# Patient Record
Sex: MALE | Race: WHITE | NOT HISPANIC OR LATINO | URBAN - METROPOLITAN AREA
[De-identification: names, ages, dates, MRNs, and addresses within clinical notes are randomized per-mention and may not be internally consistent; named-entity substitution may affect disease eponyms.]

---

## 2024-10-11 ENCOUNTER — HOSPITAL ENCOUNTER (INPATIENT)
Facility: HOSPITAL | Age: 39
LOS: 1 days | Discharge: HOME | DRG: 308 | End: 2024-10-12
Attending: EMERGENCY MEDICINE | Admitting: INTERNAL MEDICINE
Payer: MEDICARE

## 2024-10-11 DIAGNOSIS — I47.10 SVT (SUPRAVENTRICULAR TACHYCARDIA): ICD-10-CM

## 2024-10-11 DIAGNOSIS — R06.00 DYSPNEA: ICD-10-CM

## 2024-10-11 DIAGNOSIS — I49.9 CARDIAC RHYTHM DISORDER OR DISTURBANCE OR CHANGE: ICD-10-CM

## 2024-10-11 DIAGNOSIS — R00.0 TACHYCARDIA: ICD-10-CM

## 2024-10-11 PROBLEM — N18.6 ESRD (END STAGE RENAL DISEASE): Chronic | Status: ACTIVE | Noted: 2024-10-11

## 2024-10-11 PROBLEM — E87.5 HYPERKALEMIA: Status: ACTIVE | Noted: 2024-10-11

## 2024-10-11 PROBLEM — E87.3 ACUTE RESPIRATORY ALKALOSIS: Status: ACTIVE | Noted: 2024-10-11

## 2024-10-11 LAB
ALBUMIN SERPL BCP-MCNC: 3.9 G/DL (ref 3.5–5.2)
ALLENS TEST: ABNORMAL
ALLENS TEST: ABNORMAL
ALP SERPL-CCNC: 67 U/L (ref 55–135)
ALT SERPL W/O P-5'-P-CCNC: 20 U/L (ref 10–44)
ANION GAP SERPL CALC-SCNC: 13 MMOL/L (ref 8–16)
ANION GAP SERPL CALC-SCNC: 18 MMOL/L (ref 8–16)
AST SERPL-CCNC: 56 U/L (ref 10–40)
BASOPHILS # BLD AUTO: 0.05 K/UL (ref 0–0.2)
BASOPHILS NFR BLD: 1.1 % (ref 0–1.9)
BILIRUB SERPL-MCNC: 1 MG/DL (ref 0.1–1)
BNP SERPL-MCNC: 172 PG/ML (ref 0–99)
BUN SERPL-MCNC: 26 MG/DL (ref 6–20)
BUN SERPL-MCNC: 29 MG/DL (ref 6–20)
CALCIUM SERPL-MCNC: 8.4 MG/DL (ref 8.7–10.5)
CALCIUM SERPL-MCNC: 9.9 MG/DL (ref 8.7–10.5)
CHLORIDE SERPL-SCNC: 100 MMOL/L (ref 95–110)
CHLORIDE SERPL-SCNC: 104 MMOL/L (ref 95–110)
CO2 SERPL-SCNC: 19 MMOL/L (ref 23–29)
CO2 SERPL-SCNC: 22 MMOL/L (ref 23–29)
CREAT SERPL-MCNC: 5.9 MG/DL (ref 0.5–1.4)
CREAT SERPL-MCNC: 6.1 MG/DL (ref 0.5–1.4)
DELSYS: ABNORMAL
DIFFERENTIAL METHOD BLD: ABNORMAL
EOSINOPHIL # BLD AUTO: 0.1 K/UL (ref 0–0.5)
EOSINOPHIL NFR BLD: 2 % (ref 0–8)
ERYTHROCYTE [DISTWIDTH] IN BLOOD BY AUTOMATED COUNT: 12.4 % (ref 11.5–14.5)
EST. GFR  (NO RACE VARIABLE): 11 ML/MIN/1.73 M^2
EST. GFR  (NO RACE VARIABLE): 12 ML/MIN/1.73 M^2
FIO2: 21
GLUCOSE SERPL-MCNC: 113 MG/DL (ref 70–110)
GLUCOSE SERPL-MCNC: 78 MG/DL (ref 70–110)
GLUCOSE SERPL-MCNC: 79 MG/DL (ref 70–110)
GLUCOSE SERPL-MCNC: 95 MG/DL (ref 70–110)
HCO3 UR-SCNC: 20.4 MMOL/L (ref 24–28)
HCO3 UR-SCNC: 32.7 MMOL/L (ref 24–28)
HCT VFR BLD AUTO: 53 % (ref 40–54)
HCT VFR BLD CALC: 42 %PCV (ref 36–54)
HCT VFR BLD CALC: 44 %PCV (ref 36–54)
HGB BLD-MCNC: 17.5 G/DL (ref 14–18)
IMM GRANULOCYTES # BLD AUTO: 0.02 K/UL (ref 0–0.04)
IMM GRANULOCYTES NFR BLD AUTO: 0.4 % (ref 0–0.5)
INR PPP: 1 (ref 0.8–1.2)
LYMPHOCYTES # BLD AUTO: 1.1 K/UL (ref 1–4.8)
LYMPHOCYTES NFR BLD: 25.2 % (ref 18–48)
MAGNESIUM SERPL-MCNC: 2.1 MG/DL (ref 1.6–2.6)
MAGNESIUM SERPL-MCNC: 2.3 MG/DL (ref 1.6–2.6)
MCH RBC QN AUTO: 29 PG (ref 27–31)
MCHC RBC AUTO-ENTMCNC: 33 G/DL (ref 32–36)
MCV RBC AUTO: 88 FL (ref 82–98)
MODE: ABNORMAL
MONOCYTES # BLD AUTO: 0.4 K/UL (ref 0.3–1)
MONOCYTES NFR BLD: 9.9 % (ref 4–15)
NEUTROPHILS # BLD AUTO: 2.7 K/UL (ref 1.8–7.7)
NEUTROPHILS NFR BLD: 61.4 % (ref 38–73)
NRBC BLD-RTO: 0 /100 WBC
PCO2 BLDA: 13.5 MMHG (ref 35–45)
PCO2 BLDA: 33.7 MMHG (ref 35–45)
PH SMN: 7.59 [PH] (ref 7.35–7.45)
PH SMN: 7.79 [PH] (ref 7.35–7.45)
PHOSPHATE SERPL-MCNC: 1.6 MG/DL (ref 2.7–4.5)
PLATELET # BLD AUTO: 77 K/UL (ref 150–450)
PMV BLD AUTO: 9.1 FL (ref 9.2–12.9)
PO2 BLDA: 116 MMHG (ref 80–100)
PO2 BLDA: 20 MMHG (ref 40–60)
POC BE: 11 MMOL/L
POC BE: 2 MMOL/L
POC IONIZED CALCIUM: 0.98 MMOL/L (ref 1.06–1.42)
POC IONIZED CALCIUM: 0.99 MMOL/L (ref 1.06–1.42)
POC SATURATED O2: 100 % (ref 95–100)
POC SATURATED O2: 43 % (ref 95–100)
POTASSIUM BLD-SCNC: 3.2 MMOL/L (ref 3.5–5.1)
POTASSIUM BLD-SCNC: 4.1 MMOL/L (ref 3.5–5.1)
POTASSIUM SERPL-SCNC: 3.8 MMOL/L (ref 3.5–5.1)
POTASSIUM SERPL-SCNC: 6.1 MMOL/L (ref 3.5–5.1)
PROT SERPL-MCNC: 8.4 G/DL (ref 6–8.4)
PROTHROMBIN TIME: 11.9 SEC (ref 9–12.5)
RBC # BLD AUTO: 6.03 M/UL (ref 4.6–6.2)
SAMPLE: ABNORMAL
SAMPLE: ABNORMAL
SITE: ABNORMAL
SITE: ABNORMAL
SODIUM BLD-SCNC: 136 MMOL/L (ref 136–145)
SODIUM BLD-SCNC: 139 MMOL/L (ref 136–145)
SODIUM SERPL-SCNC: 137 MMOL/L (ref 136–145)
SODIUM SERPL-SCNC: 139 MMOL/L (ref 136–145)
TROPONIN I SERPL DL<=0.01 NG/ML-MCNC: 0.03 NG/ML (ref 0–0.03)
TSH SERPL DL<=0.005 MIU/L-ACNC: 0.53 UIU/ML (ref 0.4–4)
WBC # BLD AUTO: 4.45 K/UL (ref 3.9–12.7)

## 2024-10-11 PROCEDURE — 99285 EMERGENCY DEPT VISIT HI MDM: CPT | Mod: 25

## 2024-10-11 PROCEDURE — 85014 HEMATOCRIT: CPT

## 2024-10-11 PROCEDURE — 85025 COMPLETE CBC W/AUTO DIFF WBC: CPT | Performed by: EMERGENCY MEDICINE

## 2024-10-11 PROCEDURE — 85610 PROTHROMBIN TIME: CPT | Performed by: EMERGENCY MEDICINE

## 2024-10-11 PROCEDURE — 25000003 PHARM REV CODE 250

## 2024-10-11 PROCEDURE — 99900035 HC TECH TIME PER 15 MIN (STAT)

## 2024-10-11 PROCEDURE — 93010 ELECTROCARDIOGRAM REPORT: CPT | Mod: ,,, | Performed by: STUDENT IN AN ORGANIZED HEALTH CARE EDUCATION/TRAINING PROGRAM

## 2024-10-11 PROCEDURE — 84132 ASSAY OF SERUM POTASSIUM: CPT

## 2024-10-11 PROCEDURE — 83605 ASSAY OF LACTIC ACID: CPT

## 2024-10-11 PROCEDURE — 83880 ASSAY OF NATRIURETIC PEPTIDE: CPT | Performed by: EMERGENCY MEDICINE

## 2024-10-11 PROCEDURE — 82803 BLOOD GASES ANY COMBINATION: CPT

## 2024-10-11 PROCEDURE — 51798 US URINE CAPACITY MEASURE: CPT

## 2024-10-11 PROCEDURE — 25000003 PHARM REV CODE 250: Performed by: EMERGENCY MEDICINE

## 2024-10-11 PROCEDURE — 96374 THER/PROPH/DIAG INJ IV PUSH: CPT

## 2024-10-11 PROCEDURE — 83735 ASSAY OF MAGNESIUM: CPT | Mod: 91 | Performed by: EMERGENCY MEDICINE

## 2024-10-11 PROCEDURE — 82800 BLOOD PH: CPT

## 2024-10-11 PROCEDURE — 96361 HYDRATE IV INFUSION ADD-ON: CPT

## 2024-10-11 PROCEDURE — 80053 COMPREHEN METABOLIC PANEL: CPT | Performed by: EMERGENCY MEDICINE

## 2024-10-11 PROCEDURE — 84100 ASSAY OF PHOSPHORUS: CPT

## 2024-10-11 PROCEDURE — 36600 WITHDRAWAL OF ARTERIAL BLOOD: CPT

## 2024-10-11 PROCEDURE — 83735 ASSAY OF MAGNESIUM: CPT

## 2024-10-11 PROCEDURE — 80048 BASIC METABOLIC PNL TOTAL CA: CPT

## 2024-10-11 PROCEDURE — 84295 ASSAY OF SERUM SODIUM: CPT

## 2024-10-11 PROCEDURE — 20000000 HC ICU ROOM

## 2024-10-11 PROCEDURE — 82330 ASSAY OF CALCIUM: CPT

## 2024-10-11 PROCEDURE — 93005 ELECTROCARDIOGRAM TRACING: CPT

## 2024-10-11 PROCEDURE — 84484 ASSAY OF TROPONIN QUANT: CPT | Performed by: EMERGENCY MEDICINE

## 2024-10-11 PROCEDURE — 84443 ASSAY THYROID STIM HORMONE: CPT | Performed by: EMERGENCY MEDICINE

## 2024-10-11 RX ORDER — MULTIVIT-MIN/IRON/FOLIC ACID/K 45-800-120
1 CAPSULE ORAL DAILY
Status: ON HOLD | COMMUNITY
End: 2024-10-12 | Stop reason: HOSPADM

## 2024-10-11 RX ORDER — LANTHANUM CARBONATE 1000 MG/1
1000 TABLET, CHEWABLE ORAL
COMMUNITY
Start: 2024-09-19

## 2024-10-11 RX ORDER — CHOLECALCIFEROL (VITAMIN D3) 25 MCG
5000 TABLET ORAL DAILY
COMMUNITY

## 2024-10-11 RX ORDER — ONDANSETRON HYDROCHLORIDE 2 MG/ML
4 INJECTION, SOLUTION INTRAVENOUS EVERY 6 HOURS PRN
Status: DISCONTINUED | OUTPATIENT
Start: 2024-10-11 | End: 2024-10-12 | Stop reason: HOSPADM

## 2024-10-11 RX ORDER — CALCIUM CARBONATE 200(500)MG
500 TABLET,CHEWABLE ORAL DAILY
Status: DISCONTINUED | OUTPATIENT
Start: 2024-10-12 | End: 2024-10-12 | Stop reason: HOSPADM

## 2024-10-11 RX ORDER — FLUOXETINE HYDROCHLORIDE 40 MG/1
40 CAPSULE ORAL DAILY
COMMUNITY

## 2024-10-11 RX ORDER — CALCITRIOL 0.5 UG/1
0.5 CAPSULE ORAL DAILY
COMMUNITY

## 2024-10-11 RX ORDER — ACETAMINOPHEN 325 MG/1
650 TABLET ORAL EVERY 4 HOURS PRN
Status: DISCONTINUED | OUTPATIENT
Start: 2024-10-11 | End: 2024-10-12 | Stop reason: HOSPADM

## 2024-10-11 RX ORDER — METOPROLOL TARTRATE 1 MG/ML
5 INJECTION, SOLUTION INTRAVENOUS EVERY 5 MIN PRN
Status: DISCONTINUED | OUTPATIENT
Start: 2024-10-11 | End: 2024-10-12 | Stop reason: HOSPADM

## 2024-10-11 RX ORDER — MUPIROCIN 20 MG/G
OINTMENT TOPICAL 2 TIMES DAILY
Status: DISCONTINUED | OUTPATIENT
Start: 2024-10-11 | End: 2024-10-12 | Stop reason: HOSPADM

## 2024-10-11 RX ORDER — FAMOTIDINE 20 MG/1
20 TABLET, FILM COATED ORAL DAILY
Status: DISCONTINUED | OUTPATIENT
Start: 2024-10-12 | End: 2024-10-12 | Stop reason: HOSPADM

## 2024-10-11 RX ORDER — DOCUSATE SODIUM 100 MG/1
100 CAPSULE, LIQUID FILLED ORAL DAILY
COMMUNITY

## 2024-10-11 RX ORDER — LIDOCAINE HYDROCHLORIDE 20 MG/ML
JELLY TOPICAL ONCE
Status: COMPLETED | OUTPATIENT
Start: 2024-10-11 | End: 2024-10-12

## 2024-10-11 RX ORDER — CINACALCET 30 MG/1
30 TABLET, FILM COATED ORAL
COMMUNITY

## 2024-10-11 RX ORDER — TIRZEPATIDE 12.5 MG/.5ML
12.5 INJECTION, SOLUTION SUBCUTANEOUS WEEKLY
COMMUNITY
Start: 2024-09-12

## 2024-10-11 RX ORDER — GABAPENTIN 300 MG/1
300 CAPSULE ORAL 3 TIMES DAILY
Status: DISCONTINUED | OUTPATIENT
Start: 2024-10-12 | End: 2024-10-12

## 2024-10-11 RX ORDER — TIZANIDINE HYDROCHLORIDE 4 MG/1
4 CAPSULE, GELATIN COATED ORAL 3 TIMES DAILY PRN
Status: ON HOLD | COMMUNITY
End: 2024-10-12 | Stop reason: HOSPADM

## 2024-10-11 RX ORDER — LEVOTHYROXINE SODIUM 125 UG/1
125 TABLET ORAL
COMMUNITY

## 2024-10-11 RX ORDER — LANOLIN ALCOHOL/MO/W.PET/CERES
500 CREAM (GRAM) TOPICAL DAILY
COMMUNITY

## 2024-10-11 RX ORDER — ALFUZOSIN HYDROCHLORIDE 10 MG/1
10 TABLET, EXTENDED RELEASE ORAL
COMMUNITY

## 2024-10-11 RX ORDER — CYCLOBENZAPRINE HCL 10 MG
10 TABLET ORAL 3 TIMES DAILY PRN
COMMUNITY

## 2024-10-11 RX ORDER — FAMOTIDINE 20 MG/1
20 TABLET, FILM COATED ORAL DAILY
COMMUNITY

## 2024-10-11 RX ORDER — BUPROPION HYDROCHLORIDE 300 MG/1
300 TABLET ORAL DAILY
COMMUNITY

## 2024-10-11 RX ORDER — OXYCODONE HYDROCHLORIDE 5 MG/1
15 TABLET ORAL EVERY 4 HOURS PRN
Status: DISCONTINUED | OUTPATIENT
Start: 2024-10-11 | End: 2024-10-12 | Stop reason: HOSPADM

## 2024-10-11 RX ORDER — ESOMEPRAZOLE MAGNESIUM 40 MG/1
40 CAPSULE, DELAYED RELEASE ORAL
COMMUNITY

## 2024-10-11 RX ORDER — CALCIUM CARBONATE 600 MG
600 TABLET ORAL ONCE
Status: ON HOLD | COMMUNITY
End: 2024-10-12 | Stop reason: HOSPADM

## 2024-10-11 RX ORDER — TADALAFIL 10 MG/1
10 TABLET ORAL DAILY PRN
COMMUNITY

## 2024-10-11 RX ORDER — METOPROLOL TARTRATE 1 MG/ML
INJECTION, SOLUTION INTRAVENOUS
Status: COMPLETED
Start: 2024-10-11 | End: 2024-10-11

## 2024-10-11 RX ORDER — ATORVASTATIN CALCIUM 20 MG/1
20 TABLET, FILM COATED ORAL DAILY
COMMUNITY

## 2024-10-11 RX ORDER — ATORVASTATIN CALCIUM 10 MG/1
20 TABLET, FILM COATED ORAL DAILY
Status: DISCONTINUED | OUTPATIENT
Start: 2024-10-12 | End: 2024-10-12 | Stop reason: HOSPADM

## 2024-10-11 RX ORDER — METOPROLOL SUCCINATE 25 MG/1
25 TABLET, EXTENDED RELEASE ORAL DAILY
Status: DISCONTINUED | OUTPATIENT
Start: 2024-10-12 | End: 2024-10-12

## 2024-10-11 RX ORDER — MULTIVIT,STRESS FORMULA/ZINC
TABLET ORAL DAILY
Status: ON HOLD | COMMUNITY
End: 2024-10-12 | Stop reason: HOSPADM

## 2024-10-11 RX ORDER — SODIUM CHLORIDE 0.9 % (FLUSH) 0.9 %
10 SYRINGE (ML) INJECTION
Status: DISCONTINUED | OUTPATIENT
Start: 2024-10-11 | End: 2024-10-12 | Stop reason: HOSPADM

## 2024-10-11 RX ORDER — OXYCODONE HYDROCHLORIDE 5 MG/1
15 TABLET ORAL EVERY 4 HOURS PRN
COMMUNITY

## 2024-10-11 RX ORDER — ASPIRIN 81 MG/1
81 TABLET ORAL DAILY
COMMUNITY

## 2024-10-11 RX ORDER — CHLORHEXIDINE GLUCONATE ORAL RINSE 1.2 MG/ML
15 SOLUTION DENTAL 2 TIMES DAILY
Status: DISCONTINUED | OUTPATIENT
Start: 2024-10-11 | End: 2024-10-12 | Stop reason: HOSPADM

## 2024-10-11 RX ORDER — MINERAL OIL
180 ENEMA (ML) RECTAL DAILY
COMMUNITY

## 2024-10-11 RX ORDER — B,C/FOLIC/ZINC/SELENOMETH/D3/E 0.8-12.5MG
1 TABLET ORAL DAILY
COMMUNITY

## 2024-10-11 RX ORDER — ASPIRIN 81 MG/1
81 TABLET ORAL DAILY
Status: DISCONTINUED | OUTPATIENT
Start: 2024-10-12 | End: 2024-10-12 | Stop reason: HOSPADM

## 2024-10-11 RX ORDER — MECOBALAMIN 1000 MCG
1000 TABLET,CHEWABLE ORAL DAILY
Status: ON HOLD | COMMUNITY
End: 2024-10-12 | Stop reason: HOSPADM

## 2024-10-11 RX ORDER — LANOLIN ALCOHOL/MO/W.PET/CERES
100 CREAM (GRAM) TOPICAL DAILY
COMMUNITY

## 2024-10-11 RX ORDER — FERRIC CITRATE 210 MG/1
420 TABLET, COATED ORAL 3 TIMES DAILY
COMMUNITY

## 2024-10-11 RX ORDER — GABAPENTIN 300 MG/1
300 CAPSULE ORAL 3 TIMES DAILY
Status: ON HOLD | COMMUNITY
End: 2024-10-12 | Stop reason: HOSPADM

## 2024-10-11 RX ADMIN — CHLORHEXIDINE GLUCONATE 0.12% ORAL RINSE 15 ML: 1.2 LIQUID ORAL at 11:10

## 2024-10-11 RX ADMIN — OXYCODONE HYDROCHLORIDE 15 MG: 5 TABLET ORAL at 11:10

## 2024-10-11 RX ADMIN — METOROPROLOL TARTRATE 5 MG: 5 INJECTION, SOLUTION INTRAVENOUS at 07:10

## 2024-10-11 RX ADMIN — MUPIROCIN: 20 OINTMENT TOPICAL at 11:10

## 2024-10-11 RX ADMIN — METOPROLOL TARTRATE 5 MG: 1 INJECTION, SOLUTION INTRAVENOUS at 07:10

## 2024-10-11 RX ADMIN — SODIUM CHLORIDE 250 ML: 9 INJECTION, SOLUTION INTRAVENOUS at 07:10

## 2024-10-12 VITALS
HEART RATE: 68 BPM | HEIGHT: 72 IN | BODY MASS INDEX: 27.63 KG/M2 | RESPIRATION RATE: 27 BRPM | TEMPERATURE: 99 F | SYSTOLIC BLOOD PRESSURE: 112 MMHG | WEIGHT: 204 LBS | DIASTOLIC BLOOD PRESSURE: 55 MMHG | OXYGEN SATURATION: 97 %

## 2024-10-12 PROBLEM — N25.81 HYPERPARATHYROIDISM DUE TO RENAL INSUFFICIENCY: Status: ACTIVE | Noted: 2024-08-13

## 2024-10-12 PROBLEM — D63.1 ANEMIA IN CHRONIC KIDNEY DISEASE: Chronic | Status: ACTIVE | Noted: 2020-03-18

## 2024-10-12 PROBLEM — G47.33 OSA (OBSTRUCTIVE SLEEP APNEA): Status: ACTIVE | Noted: 2024-08-13

## 2024-10-12 PROBLEM — I77.9 PERIPHERAL ARTERIAL OCCLUSIVE DISEASE: Status: ACTIVE | Noted: 2024-10-12

## 2024-10-12 PROBLEM — Z86.718 HISTORY OF DVT (DEEP VEIN THROMBOSIS): Status: ACTIVE | Noted: 2018-09-12

## 2024-10-12 PROBLEM — M10.9 GOUT: Status: ACTIVE | Noted: 2024-08-13

## 2024-10-12 PROBLEM — I12.0 HYPERTENSIVE CHRONIC KIDNEY DISEASE WITH STAGE 5 CHRONIC KIDNEY DISEASE OR END STAGE RENAL DISEASE: Status: ACTIVE | Noted: 2021-05-12

## 2024-10-12 PROBLEM — F41.9 ANXIETY DISORDER, UNSPECIFIED: Status: ACTIVE | Noted: 2021-06-25

## 2024-10-12 PROBLEM — E83.30 DISORDER OF PHOSPHORUS METABOLISM, UNSPECIFIED: Status: ACTIVE | Noted: 2021-12-06

## 2024-10-12 PROBLEM — R33.9 ACUTE ON CHRONIC URINARY RETENTION: Status: ACTIVE | Noted: 2024-10-12

## 2024-10-12 PROBLEM — D50.9 IRON DEFICIENCY ANEMIA, UNSPECIFIED: Status: ACTIVE | Noted: 2021-06-11

## 2024-10-12 PROBLEM — Q61.3 POLYCYSTIC KIDNEY DISEASE: Status: ACTIVE | Noted: 2018-05-02

## 2024-10-12 PROBLEM — I50.9 CONGESTIVE HEART FAILURE: Status: ACTIVE | Noted: 2024-08-22

## 2024-10-12 PROBLEM — N40.0 BENIGN PROSTATIC HYPERPLASIA WITHOUT URINARY OBSTRUCTION: Status: ACTIVE | Noted: 2024-08-13

## 2024-10-12 PROBLEM — M17.0 PRIMARY OSTEOARTHRITIS OF BOTH KNEES: Status: ACTIVE | Noted: 2017-03-02

## 2024-10-12 PROBLEM — N18.9 ANEMIA IN CHRONIC KIDNEY DISEASE: Chronic | Status: ACTIVE | Noted: 2020-03-18

## 2024-10-12 PROBLEM — E08.22 DIABETES MELLITUS DUE TO UNDERLYING CONDITION WITH DIABETIC CHRONIC KIDNEY DISEASE: Status: ACTIVE | Noted: 2021-07-12

## 2024-10-12 LAB
ALBUMIN SERPL BCP-MCNC: 3.1 G/DL (ref 3.5–5.2)
ALLENS TEST: ABNORMAL
ALP SERPL-CCNC: 48 U/L (ref 55–135)
ALT SERPL W/O P-5'-P-CCNC: 10 U/L (ref 10–44)
ANION GAP SERPL CALC-SCNC: 11 MMOL/L (ref 8–16)
AORTIC ROOT ANNULUS: 3.65 CM
ASCENDING AORTA: 3.78 CM
AST SERPL-CCNC: 13 U/L (ref 10–40)
AV INDEX (PROSTH): 0.71
AV MEAN GRADIENT: 4.3 MMHG
AV PEAK GRADIENT: 6.8 MMHG
AV VALVE AREA BY VELOCITY RATIO: 2.6 CM²
AV VALVE AREA: 3 CM²
AV VELOCITY RATIO: 0.62
BASOPHILS # BLD AUTO: 0.02 K/UL (ref 0–0.2)
BASOPHILS NFR BLD: 0.4 % (ref 0–1.9)
BILIRUB SERPL-MCNC: 1 MG/DL (ref 0.1–1)
BSA FOR ECHO PROCEDURE: 2.17 M2
BUN SERPL-MCNC: 32 MG/DL (ref 6–20)
CALCIUM SERPL-MCNC: 8.6 MG/DL (ref 8.7–10.5)
CHLORIDE SERPL-SCNC: 102 MMOL/L (ref 95–110)
CK SERPL-CCNC: 36 U/L (ref 20–200)
CK SERPL-CCNC: 38 U/L (ref 20–200)
CO2 SERPL-SCNC: 27 MMOL/L (ref 23–29)
CREAT SERPL-MCNC: 6.6 MG/DL (ref 0.5–1.4)
CV ECHO LV RWT: 0.42 CM
DIFFERENTIAL METHOD BLD: ABNORMAL
DOP CALC AO PEAK VEL: 1.3 M/S
DOP CALC AO VTI: 25.9 CM
DOP CALC LVOT AREA: 4.2 CM2
DOP CALC LVOT DIAMETER: 2.3 CM
DOP CALC LVOT PEAK VEL: 0.8 M/S
DOP CALC LVOT STROKE VOLUME: 76.4 CM3
DOP CALC RVOT PEAK VEL: 0.8 M/S
DOP CALC RVOT VTI: 18.3 CM
DOP CALCLVOT PEAK VEL VTI: 18.4 CM
E WAVE DECELERATION TIME: 178.41 MSEC
E/A RATIO: 1.47
E/E' RATIO: 5.68 M/S
ECHO LV POSTERIOR WALL: 1.1 CM (ref 0.6–1.1)
EJECTION FRACTION: 65 %
EOSINOPHIL # BLD AUTO: 0.1 K/UL (ref 0–0.5)
EOSINOPHIL NFR BLD: 0.9 % (ref 0–8)
ERYTHROCYTE [DISTWIDTH] IN BLOOD BY AUTOMATED COUNT: 12.2 % (ref 11.5–14.5)
EST. GFR  (NO RACE VARIABLE): 10 ML/MIN/1.73 M^2
FRACTIONAL SHORTENING: 30.8 % (ref 28–44)
GLUCOSE SERPL-MCNC: 112 MG/DL (ref 70–110)
HCO3 UR-SCNC: 25.2 MMOL/L (ref 24–28)
HCT VFR BLD AUTO: 42.1 % (ref 40–54)
HGB BLD-MCNC: 14.1 G/DL (ref 14–18)
IMM GRANULOCYTES # BLD AUTO: 0.01 K/UL (ref 0–0.04)
IMM GRANULOCYTES NFR BLD AUTO: 0.2 % (ref 0–0.5)
INTERVENTRICULAR SEPTUM: 1.1 CM (ref 0.6–1.1)
IVC DIAMETER: 1.72 CM
IVRT: 53.28 MSEC
LA MAJOR: 4.88 CM
LA MINOR: 5.17 CM
LA WIDTH: 4 CM
LEFT ATRIUM SIZE: 3.94 CM
LEFT ATRIUM VOLUME INDEX: 31.3 ML/M2
LEFT ATRIUM VOLUME: 67.26 CM3
LEFT INTERNAL DIMENSION IN SYSTOLE: 3.6 CM (ref 2.1–4)
LEFT VENTRICLE DIASTOLIC VOLUME INDEX: 61.34 ML/M2
LEFT VENTRICLE DIASTOLIC VOLUME: 131.89 ML
LEFT VENTRICLE MASS INDEX: 102.7 G/M2
LEFT VENTRICLE SYSTOLIC VOLUME INDEX: 25 ML/M2
LEFT VENTRICLE SYSTOLIC VOLUME: 53.8 ML
LEFT VENTRICULAR INTERNAL DIMENSION IN DIASTOLE: 5.2 CM (ref 3.5–6)
LEFT VENTRICULAR MASS: 220.8 G
LV LATERAL E/E' RATIO: 4.89 M/S
LV SEPTAL E/E' RATIO: 6.77 M/S
LVED V (TEICH): 131.89 ML
LVES V (TEICH): 53.8 ML
LVOT MG: 1.89 MMHG
LVOT MV: 0.67 CM/S
LYMPHOCYTES # BLD AUTO: 0.6 K/UL (ref 1–4.8)
LYMPHOCYTES NFR BLD: 11.3 % (ref 18–48)
MAGNESIUM SERPL-MCNC: 2.2 MG/DL (ref 1.6–2.6)
MCH RBC QN AUTO: 29.1 PG (ref 27–31)
MCHC RBC AUTO-ENTMCNC: 33.5 G/DL (ref 32–36)
MCV RBC AUTO: 87 FL (ref 82–98)
MONOCYTES # BLD AUTO: 0.6 K/UL (ref 0.3–1)
MONOCYTES NFR BLD: 10.9 % (ref 4–15)
MV PEAK A VEL: 0.6 M/S
MV PEAK E VEL: 0.88 M/S
MV STENOSIS PRESSURE HALF TIME: 51.74 MS
MV VALVE AREA P 1/2 METHOD: 4.25 CM2
NEUTROPHILS # BLD AUTO: 4.3 K/UL (ref 1.8–7.7)
NEUTROPHILS NFR BLD: 76.3 % (ref 38–73)
NRBC BLD-RTO: 0 /100 WBC
PCO2 BLDA: 31.3 MMHG (ref 35–45)
PH SMN: 7.51 [PH] (ref 7.35–7.45)
PHOSPHATE SERPL-MCNC: 3.1 MG/DL (ref 2.7–4.5)
PISA TR MAX VEL: 1.98 M/S
PLATELET # BLD AUTO: 71 K/UL (ref 150–450)
PMV BLD AUTO: 8.7 FL (ref 9.2–12.9)
PO2 BLDA: 61 MMHG (ref 40–60)
POC BE: 2 MMOL/L
POC SATURATED O2: 94 % (ref 95–100)
POTASSIUM SERPL-SCNC: 4.2 MMOL/L (ref 3.5–5.1)
PROT SERPL-MCNC: 5.5 G/DL (ref 6–8.4)
PV MEAN GRADIENT: 2 MMHG
RA MAJOR: 4.28 CM
RA PRESSURE ESTIMATED: 3 MMHG
RA WIDTH: 3.5 CM
RBC # BLD AUTO: 4.85 M/UL (ref 4.6–6.2)
RV TB RVSP: 5 MMHG
SAMPLE: ABNORMAL
SITE: ABNORMAL
SODIUM SERPL-SCNC: 140 MMOL/L (ref 136–145)
STJ: 3.96 CM
TDI LATERAL: 0.18 M/S
TDI SEPTAL: 0.13 M/S
TDI: 0.16 M/S
TR MAX PG: 16 MMHG
TRICUSPID ANNULAR PLANE SYSTOLIC EXCURSION: 2.42 CM
TROPONIN I SERPL DL<=0.01 NG/ML-MCNC: 0.29 NG/ML (ref 0–0.03)
TROPONIN I SERPL DL<=0.01 NG/ML-MCNC: 0.35 NG/ML (ref 0–0.03)
TROPONIN I SERPL DL<=0.01 NG/ML-MCNC: 0.42 NG/ML (ref 0–0.03)
TROPONIN I SERPL DL<=0.01 NG/ML-MCNC: 0.44 NG/ML (ref 0–0.03)
TV REST PULMONARY ARTERY PRESSURE: 19 MMHG
WBC # BLD AUTO: 5.67 K/UL (ref 3.9–12.7)
Z-SCORE OF LEFT VENTRICULAR DIMENSION IN END DIASTOLE: -2.94
Z-SCORE OF LEFT VENTRICULAR DIMENSION IN END SYSTOLE: -1.32

## 2024-10-12 PROCEDURE — 63600175 PHARM REV CODE 636 W HCPCS

## 2024-10-12 PROCEDURE — 84484 ASSAY OF TROPONIN QUANT: CPT | Mod: 91 | Performed by: INTERNAL MEDICINE

## 2024-10-12 PROCEDURE — 85025 COMPLETE CBC W/AUTO DIFF WBC: CPT

## 2024-10-12 PROCEDURE — 99223 1ST HOSP IP/OBS HIGH 75: CPT | Mod: 25,,, | Performed by: STUDENT IN AN ORGANIZED HEALTH CARE EDUCATION/TRAINING PROGRAM

## 2024-10-12 PROCEDURE — 90656 IIV3 VACC NO PRSV 0.5 ML IM: CPT

## 2024-10-12 PROCEDURE — 90471 IMMUNIZATION ADMIN: CPT

## 2024-10-12 PROCEDURE — 36415 COLL VENOUS BLD VENIPUNCTURE: CPT | Mod: XB | Performed by: INTERNAL MEDICINE

## 2024-10-12 PROCEDURE — 99900035 HC TECH TIME PER 15 MIN (STAT)

## 2024-10-12 PROCEDURE — 63600175 PHARM REV CODE 636 W HCPCS: Performed by: INTERNAL MEDICINE

## 2024-10-12 PROCEDURE — G0008 ADMIN INFLUENZA VIRUS VAC: HCPCS

## 2024-10-12 PROCEDURE — 83735 ASSAY OF MAGNESIUM: CPT

## 2024-10-12 PROCEDURE — 36415 COLL VENOUS BLD VENIPUNCTURE: CPT

## 2024-10-12 PROCEDURE — 82803 BLOOD GASES ANY COMBINATION: CPT

## 2024-10-12 PROCEDURE — 82550 ASSAY OF CK (CPK): CPT

## 2024-10-12 PROCEDURE — 25000003 PHARM REV CODE 250: Performed by: INTERNAL MEDICINE

## 2024-10-12 PROCEDURE — 3E02340 INTRODUCTION OF INFLUENZA VACCINE INTO MUSCLE, PERCUTANEOUS APPROACH: ICD-10-PCS | Performed by: INTERNAL MEDICINE

## 2024-10-12 PROCEDURE — 84100 ASSAY OF PHOSPHORUS: CPT

## 2024-10-12 PROCEDURE — 25000003 PHARM REV CODE 250

## 2024-10-12 PROCEDURE — 84484 ASSAY OF TROPONIN QUANT: CPT | Mod: 91

## 2024-10-12 PROCEDURE — 80053 COMPREHEN METABOLIC PANEL: CPT

## 2024-10-12 RX ORDER — LIDOCAINE HYDROCHLORIDE 20 MG/ML
JELLY TOPICAL ONCE
Status: DISCONTINUED | OUTPATIENT
Start: 2024-10-12 | End: 2024-10-12 | Stop reason: HOSPADM

## 2024-10-12 RX ORDER — HEPARIN SODIUM 5000 [USP'U]/ML
5000 INJECTION, SOLUTION INTRAVENOUS; SUBCUTANEOUS EVERY 8 HOURS
Status: DISCONTINUED | OUTPATIENT
Start: 2024-10-12 | End: 2024-10-12 | Stop reason: HOSPADM

## 2024-10-12 RX ORDER — METOPROLOL TARTRATE 1 MG/ML
2.5 INJECTION, SOLUTION INTRAVENOUS EVERY 6 HOURS PRN
Status: DISCONTINUED | OUTPATIENT
Start: 2024-10-12 | End: 2024-10-12 | Stop reason: HOSPADM

## 2024-10-12 RX ORDER — TAMSULOSIN HYDROCHLORIDE 0.4 MG/1
0.4 CAPSULE ORAL DAILY
Status: DISCONTINUED | OUTPATIENT
Start: 2024-10-12 | End: 2024-10-12 | Stop reason: HOSPADM

## 2024-10-12 RX ORDER — METOPROLOL SUCCINATE 25 MG/1
12.5 TABLET, EXTENDED RELEASE ORAL DAILY
Qty: 30 TABLET | Refills: 0 | Status: SHIPPED | OUTPATIENT
Start: 2024-10-13 | End: 2025-10-13

## 2024-10-12 RX ORDER — LORAZEPAM 2 MG/ML
2 INJECTION INTRAMUSCULAR ONCE
Status: COMPLETED | OUTPATIENT
Start: 2024-10-12 | End: 2024-10-12

## 2024-10-12 RX ADMIN — HEPARIN SODIUM 5000 UNITS: 5000 INJECTION INTRAVENOUS; SUBCUTANEOUS at 01:10

## 2024-10-12 RX ADMIN — METOPROLOL SUCCINATE 12.5 MG: 25 TABLET, EXTENDED RELEASE ORAL at 01:10

## 2024-10-12 RX ADMIN — ASPIRIN 81 MG: 81 TABLET, COATED ORAL at 08:10

## 2024-10-12 RX ADMIN — ATORVASTATIN CALCIUM 20 MG: 10 TABLET, FILM COATED ORAL at 08:10

## 2024-10-12 RX ADMIN — SODIUM PHOSPHATE, MONOBASIC, MONOHYDRATE AND SODIUM PHOSPHATE, DIBASIC, ANHYDROUS 15 MMOL: 142; 276 INJECTION, SOLUTION INTRAVENOUS at 01:10

## 2024-10-12 RX ADMIN — CHLORHEXIDINE GLUCONATE 0.12% ORAL RINSE 15 ML: 1.2 LIQUID ORAL at 08:10

## 2024-10-12 RX ADMIN — FAMOTIDINE 20 MG: 20 TABLET ORAL at 08:10

## 2024-10-12 RX ADMIN — OXYCODONE HYDROCHLORIDE 15 MG: 5 TABLET ORAL at 01:10

## 2024-10-12 RX ADMIN — MUPIROCIN: 20 OINTMENT TOPICAL at 08:10

## 2024-10-12 RX ADMIN — LEVOTHYROXINE SODIUM 125 MCG: 25 TABLET ORAL at 05:10

## 2024-10-12 RX ADMIN — INFLUENZA VIRUS VACCINE 0.5 ML: 15; 15; 15 SUSPENSION INTRAMUSCULAR at 03:10

## 2024-10-12 RX ADMIN — LORAZEPAM 2 MG: 2 INJECTION INTRAMUSCULAR; INTRAVENOUS at 12:10

## 2024-10-12 RX ADMIN — CALCIUM CARBONATE (ANTACID) CHEW TAB 500 MG 500 MG: 500 CHEW TAB at 08:10

## 2024-10-12 RX ADMIN — ACETAMINOPHEN 650 MG: 325 TABLET ORAL at 01:10

## 2024-10-12 RX ADMIN — TAMSULOSIN HYDROCHLORIDE 0.4 MG: 0.4 CAPSULE ORAL at 02:10

## 2024-10-12 RX ADMIN — LIDOCAINE HYDROCHLORIDE 5 ML: 20 JELLY TOPICAL at 12:10

## 2024-10-12 NOTE — HPI
Jose Hunt is a 38 year old male with a PMHx of ESRD who presents after a SVT episode. Patient reports having family issues of cardiac issues. His father had a pacemaker and defibrillator due to afib and his mother had a CABG x4. [place] doesn't have any cardiology issues or diagnoses. Symptoms included chest pain and tightness , dizziness, loss of vision and weakness/ numbness everywhere in his body. Patient states he began to have symptoms while receiving dialysis at his home. He reported of having elevated heart rate and tightness in his chest retirement through his dialysis session. He checked his blood pressure and heart rate after symptoms began and noted his heart rate was elevated and blood pressure was at it's baseline. He used 100 cc of saline to help combat symptoms and reported that it worsened his symptoms by dropping his blood pressure and elevating his heart rate even more. Patient reported stopping dialysis and calling paramedics.Paramedic reports an elevated HR in the 170s on scene. Adenosine 12 mg  was administered twice and he was shocked at 85 J both did not have any change. Patient denies any recent infections, new medication, and drinking any energy drinks in the past 2-3 days. Patient is not with any symptoms at the moment. Currently patient was administered metoprolol for aide in episode of SVT, which was reported to help. Recommend being placed on a baby dose of metoprolol or proceeding with an ablation procedure. Troponin has trended down from 0.348 to 0.288. BNP elevated at 172.      ECHO:Left Ventricle: The left ventricle is normal in size. Normal wall thickness. Normal wall motion. There is normal systolic function with a visually estimated ejection fraction of 60 - 65%. Ejection fraction is approximately 65%. There is normal diastolic function.    Right Ventricle: Normal right ventricular cavity size. Wall thickness is normal. Right ventricle wall  motion  is normal. Systolic function is normal.    Pulmonary Artery: The estimated pulmonary artery systolic pressure is 19 mmHg.    IVC/SVC: Normal venous pressure at 3 mmHg.     EKG:  Sinus rhythm.  Rate 91 beats per minute.  Normal axis.  No ST segment elevation.  No STEMI.  No hyperacute T-waves.

## 2024-10-12 NOTE — ASSESSMENT & PLAN NOTE
- terminated w/1 dose Metoprolol IV, s/p 250 cc bolus  - start Toprol XL as BP tolerates  - check echo  - cardiology consulted

## 2024-10-12 NOTE — NURSING
Discharge instruction provide and patient educated. Medications sent to pharmacy. IV removed and all hospital equipment removed from patient. Patient is stable and family supportive.

## 2024-10-12 NOTE — ASSESSMENT & PLAN NOTE
Creatine stable for now. BMP reviewed- noted Estimated Creatinine Clearance: 18.3 mL/min (A) (based on SCr of 5.9 mg/dL (H)). according to latest data. Based on current GFR, CKD stage is end stage.  Monitor UOP and serial BMP and adjust therapy as needed. Renally dose meds. Avoid nephrotoxic medications and procedures.  - no need for urgent HD  - continue fluid restriction  - monitor electrolytes and replete as indicated  - consult nephrology in am for HD management

## 2024-10-12 NOTE — PLAN OF CARE
[facility] - Intensive Care (Hospital)  Initial Discharge Assessment       Primary Care Provider: Melva, Primary Doctor    Admission Diagnosis: SVT (supraventricular tachycardia) [I47.10]  Dyspnea [R06.00]  Tachycardia [R00.0]  Cardiac rhythm disorder or disturbance or change [I49.9]    Admission Date: 10/11/2024  Expected Discharge Date:     Transition of Care Barriers: None    Payor: HU[employer] MANAGED MEDICARE / Plan: HU[employer] SNP HMO PPO SPECIAL NEEDS / Product Type: Medicare Advantage /     Extended Emergency Contact Information  Primary Emergency Contact: Denise Limon   [facility]  Home Phone: [phone]  Relation: Relative    Discharge Plan A: Home         [facility] - [address] [address]  Phone: [phone] Fax: [phone]      Initial Assessment (most recent)       Adult Discharge Assessment - 10/12/24 1052          Discharge Assessment    Assessment Type Discharge Planning Assessment     Confirmed/corrected address, phone number and insurance Yes     Confirmed Demographics Correct on Facesheet     Source of Information patient     Does patient/caregiver understand observation status Yes     Communicated [place] with patient/caregiver Date not available/Unable to determine     People in Home parent(s)     Do you expect to return to your current living situation? Yes     Do you have help at home or someone to help you manage your care at home? No     Prior to hospitilization cognitive status: Alert/Oriented     Current cognitive status: Alert/Oriented     Walking or Climbing Stairs Difficulty no     Dressing/Bathing Difficulty no     Equipment Currently Used at Home none     Readmission within 30 days? No     Patient currently being followed by outpatient case management? No     Do you currently have service(s) that help you manage your care at home? No     Do you take prescription medications? Yes     Do you have prescription coverage?  Yes     Do you have any problems affording any of your prescribed medications? No     Is the patient taking medications as prescribed? yes     Who is going to help you get home at discharge? family     How do you get to doctors appointments? car, drives self     Are you on dialysis? Yes     Dialysis Name and Scheduled days Dialysis at home; MWF     Do you take coumadin? No     Discharge Plan A Home     DME Needed Upon Discharge  none     Discharge Plan discussed with: Patient     Transition of Care Barriers None

## 2024-10-12 NOTE — H&P
[facility] - Intensive Care ([facility])  Critical Care Medicine  History & Physical    Patient Name: Jose Hunt  MRN: [id number]  Admission Date: 10/11/2024  Hospital Length of Stay: 1 days  Code Status: Full Code  Attending Physician: Josette Lamb MD   Primary Care Provider: Melva, Primary Doctor   Principal Problem: Paroxysmal SVT (supraventricular tachycardia)    Subjective:     HPI:  Jose Hunt is a 38-year-old male with a PMHx of PCKD, ESRD-on HD MWF at home, followed by Dr. Lorenzo, chronic systolic heart failure (recent EF 55%), HLD, GERD, obesity, s/p sleeve gastrectomy, MDD, JOHNIE, hypokalemia-not on supplement, who presented to the ED by EMS for evaluation of palpitations and tachycardia onset during dialysis at home. Patient states he had been in his usual state of health until approximately senior care through his 3.5-4 hr treatment when he felt dizzy and noted his heart rate 180-190. UF today 1 L. He re-infused and gave himself a 100 cc NS bolus. There were no changes to his UF rate of 400. He does make urine and reports 4-5 voids/day. He denies any decreased oral or solute intake, syncope, vision change, focal neurological symptoms, chest pain, abdominal pain, n/v/d, edema, bleeding, urinary symptoms. This has never happened before and he reports no arrhythmia history. He states he became very anxious with palpitations. EMS was called. He was given 2 doses of 12 mg Adenosine w/no response. He was cardioverted at 85 J, also with no change.     Initial EKG on arrival showed SVT, no STEMI. Patient had been hyperventilating since prior to arrival and arrived with carpal pedal spasms. He was treated with 5 mg metoprolol IV with return to SR in 90's. Hyperventilation and spasms resolved with termination of SVT. CXR w/naf. Initial ABG with respiratory alkalosis-7.787/13.5/116/20.4/100%. Repeat VBG showed 7.595/33.7/32.7. CBC unrevealing. Chemistry showed K 6.1, CO2 19, AG 18, BUN 26, Cr 6.1, which directly  conflicted with K noted on ABG of 3.2. Serum potassium repeated showing <2. K on VBG 4.1, which was verified by repeat BMP showing K 3.8. Patient showed no signs of heart failure exacerbation or stroke. Cardiology was consulted by ED and recommended Toprol XL, admit, echo in am. Due to inconsistencies in K, concern for tachycardia other arrhythmias and electrolyte derangements, CC consulted for admission.     Hospital/ICU Course:  No notes on file     Past Medical History:   Diagnosis Date    Aortic atherosclerosis 2016    BPH (benign prostatic hyperplasia)     Chronic kidney disease, unspecified     Chronic systolic heart failure 2014    DDD (degenerative disc disease), cervical 2015    DDD (degenerative disc disease), lumbar 2015    Diverticulosis     ESRD (end stage renal disease) on dialysis     GERD (gastroesophageal reflux disease) 2016    Hypokalemia     Hypothyroidism, unspecified     Major depressive disorder, single episode, unspecified 2015    Mixed hyperlipidemia     Obesity, unspecified     JOHNIE (obstructive sleep apnea)     Polycystic kidney, unspecified 2018    Primary osteoarthritis of both knees        Past Surgical History:   Procedure Laterality Date    AV FISTULA PLACEMENT Right     CHOLECYSTECTOMY  2008    Excess skin removal  2019    INGUINAL HERNIA REPAIR  2019    LAPAROSCOPIC SLEEVE GASTRECTOMY  2016    ORIF FEMUR DECOMPRESSION Right 1997    ROTATOR CUFF REPAIR      TONSILLECTOMY         Review of patient's allergies indicates:   Allergen Reactions    Lisinopril Anaphylaxis    Penicillins Anaphylaxis       Family History       Problem Relation (Age of Onset)    Coronary artery disease Mother, Father    Diabetes Mother    Heart failure Mother    Hypertension Mother, Father    Kidney disease Mother          Tobacco Use    Smoking status: Not on file    Smokeless tobacco: Not on file   Substance and Sexual Activity    Alcohol use: Not on file    Drug use: Not on file    Sexual activity: Not on  file         Review of Systems   Constitutional:  Positive for fatigue. Negative for appetite change, chills, fever and unexpected weight change.   HENT: Negative.     Eyes: Negative.    Respiratory: Negative.     Cardiovascular:  Positive for palpitations. Negative for chest pain and leg swelling.   Gastrointestinal: Negative.    Endocrine: Negative.    Genitourinary: Negative.    Musculoskeletal: Negative.    Allergic/Immunologic: Negative.    Neurological:  Positive for light-headedness and headaches.   Hematological: Negative.    Psychiatric/Behavioral: Negative.       Objective:     Vital Signs (Most Recent):  Temp: 98.3 °F (36.8 °C) (10/11/24 1940)  Pulse: 83 (10/11/24 2111)  Resp: 13 (10/11/24 2111)  BP: 110/62 (10/11/24 2110)  SpO2: 99 % (10/11/24 2111) Vital Signs (24h Range):  Temp:  [98.3 °F (36.8 °C)] 98.3 °F (36.8 °C)  Pulse:  [] 83  Resp:  [13-41] 13  SpO2:  [92 %-100 %] 99 %  BP: ()/(53-64) 110/62     Weight: 94.5 kg (208 lb 5.4 oz)  Body mass index is 28.26 kg/m².      Intake/Output Summary (Last 24 hours) at 10/11/2024 2141  Last data filed at 10/11/2024 2030  Gross per 24 hour   Intake 250 ml   Output --   Net 250 ml        Physical Exam  Vitals and nursing note reviewed.   Constitutional:       General: He is awake. He is not in acute distress.     Appearance: He is well-developed, well-groomed and overweight.   HENT:      Head: Normocephalic and atraumatic.      Nose: Nose normal.      Mouth/Throat:      Mouth: Mucous membranes are dry.      Pharynx: Oropharynx is clear.   Eyes:      Extraocular Movements: Extraocular movements intact.      Conjunctiva/sclera: Conjunctivae normal.      Pupils: Pupils are equal, round, and reactive to light.   Neck:      Vascular: No JVD.   Cardiovascular:      Rate and Rhythm: Normal rate and regular rhythm.      Pulses: Normal pulses.      Heart sounds: Normal heart sounds.   Pulmonary:      Effort: Pulmonary effort is normal.      Breath sounds:  Normal breath sounds.   Abdominal:      General: Bowel sounds are normal. There is no distension.      Palpations: Abdomen is soft.      Tenderness: There is no abdominal tenderness. There is no guarding.   Musculoskeletal:      Cervical back: Normal range of motion and neck supple.      Right lower leg: No edema.      Left lower leg: No edema.   Skin:     General: Skin is warm and dry.      Capillary Refill: Capillary refill takes less than 2 seconds.   Neurological:      General: No focal deficit present.      Mental Status: He is alert.      GCS: GCS eye subscore is 4. GCS verbal subscore is 5. GCS motor subscore is 6.      Cranial Nerves: Cranial nerves 2-12 are intact.   Psychiatric:         Behavior: Behavior normal. Behavior is cooperative.          Vents:       Lines/Drains/Airways       Peripheral Intravenous Line  Duration                  Peripheral IV - Single Lumen 10/11/24 1904 18 G Left Antecubital <1 day         Peripheral IV - Single Lumen 10/11/24 1923 20 G Anterior;Left Wrist <1 day                  Significant Labs:    CBC/Anemia Profile:  Recent Labs   Lab 10/11/24  1926 10/11/24  1940 10/11/24  2111   WBC 4.45  --   --    HGB 17.5  --   --    HCT 53.0 44 42   PLT 77*  --   --    MCV 88  --   --    RDW 12.4  --   --        Chemistries:  Recent Labs   Lab 10/11/24  1926 10/11/24  2116    139   K 6.1* 3.8    104   CO2 19* 22*   BUN 26* 29*   CREATININE 6.1* 5.9*   CALCIUM 9.9 8.4*   ALBUMIN 3.9  --    PROT 8.4  --    BILITOT 1.0  --    ALKPHOS 67  --    ALT 20  --    AST 56*  --    MG 2.3  --      ABGs:   Recent Labs   Lab 10/11/24  2111   PH 7.595*   PCO2 33.7*   HCO3 32.7*   POCSATURATED 43   BE 11*     Coagulation:   Recent Labs   Lab 10/11/24  1926   INR 1.0     Troponin:   Recent Labs   Lab 10/11/24  1926   TROPONINI 0.026   BNP  Recent Labs   Lab 10/11/24  1926   *       All pertinent labs within the past 24 hours have been reviewed.    Significant Imaging:   I have  reviewed all pertinent imaging results/findings within the past 24 hours.  Assessment/Plan:     Cardiac/Vascular  * Paroxysmal SVT (supraventricular tachycardia)  - terminated w/1 dose Metoprolol IV, s/p 250 cc bolus  - start Toprol XL as BP tolerates  - check echo  - cardiology consulted    Mixed hyperlipidemia  - statin      Chronic systolic heart failure  - reports no chronic issues   - repeat echo in am  - no signs of exacerbation    Renal/  Acute respiratory alkalosis  - hyperventilation/spasms resolved  - pH improving  - repeat VBG, check CK    ESRD (end stage renal disease)  Creatine stable for now. BMP reviewed- noted Estimated Creatinine Clearance: 18.3 mL/min (A) (based on SCr of 5.9 mg/dL (H)). according to latest data. Based on current GFR, CKD stage is end stage.  Monitor UOP and serial BMP and adjust therapy as needed. Renally dose meds. Avoid nephrotoxic medications and procedures.  - no need for urgent HD  - continue fluid restriction  - monitor electrolytes and replete as indicated  - consult nephrology in am for HD management     Hyperkalemia  - appears discrepancy may have been blood draw related  - repeat BMP and VBG correlating w/K 3.9-4.1  - monitor electrolytes/RFP and replete as indicated            Endocrine  Hypothyroidism, unspecified  - continue home med  - TSH normal        Critical Care Daily Checklist:    A: Awake: RASS Goal/Actual Goal:    Actual:     B: Spontaneous Breathing Trial Performed?     C: SAT & SBT Coordinated?  N/a                      D: Delirium: CAM-ICU Overall CAM-ICU: Negative   E: Early Mobility Performed? Yes   F: Feeding Goal:    Status:     Current Diet Order   Procedures    Diet Renal Fluid - 1200mL; Standard Tray     Order Specific Question:   Fluid restriction:     Answer:   Fluid - 1200mL     Order Specific Question:   Tray type:     Answer:   Standard Tray      AS: Analgesia/Sedation Roxicodone    T: Thromboembolic Prophylaxis SCD's   H: HOB > 300 Yes   U:  Stress Ulcer Prophylaxis (if needed) pepcid   G: Glucose Control monitor   B: Bowel Function Stool Occurrence: 1   I: Indwelling Catheter (Lines & Horner) Necessity N/a   D: De-escalation of Antimicrobials/Pharmacotherapies N/a    Plan for the day/ETD admit    Code Status:  Family/Goals of Care: Full Code  DC home     Critical Care Time: 35 minutes  Critical secondary to high risk monitoring. Patient has a condition that poses threat to life and bodily function.    Critical care was time spent personally by me on the following activities: development of treatment plan with patient or surrogate and bedside caregivers, discussions with consultants, evaluation of patient's response to treatment, examination of patient, ordering and performing treatments and interventions, ordering and review of laboratory studies, ordering and review of radiographic studies, pulse oximetry, re-evaluation of patient's condition. This critical care time did not overlap with that of any other provider or involve time for any procedures.     Rajni Hines NP  Critical Care Medicine  [facility] - Intensive Care ([facility])

## 2024-10-12 NOTE — SUBJECTIVE & OBJECTIVE
Past Medical History:   Diagnosis Date    Aortic atherosclerosis 2016    BPH (benign prostatic hyperplasia)     Chronic kidney disease, unspecified     Chronic systolic heart failure 2014    DDD (degenerative disc disease), cervical 2015    DDD (degenerative disc disease), lumbar 2015    Diverticulosis     ESRD (end stage renal disease) on dialysis     GERD (gastroesophageal reflux disease) 2016    Hypokalemia     Hypothyroidism, unspecified     Major depressive disorder, single episode, unspecified 2015    Mixed hyperlipidemia     Obesity, unspecified     JOHNIE (obstructive sleep apnea)     Polycystic kidney, unspecified 2018    Primary osteoarthritis of both knees        Past Surgical History:   Procedure Laterality Date    AV FISTULA PLACEMENT Right     CHOLECYSTECTOMY  2008    Excess skin removal  2019    INGUINAL HERNIA REPAIR  2019    LAPAROSCOPIC SLEEVE GASTRECTOMY  2016    ORIF FEMUR DECOMPRESSION Right 1997    ROTATOR CUFF REPAIR      TONSILLECTOMY         Review of patient's allergies indicates:   Allergen Reactions    Lisinopril Anaphylaxis    Penicillins Anaphylaxis       Family History       Problem Relation (Age of Onset)    Coronary artery disease Mother, Father    Diabetes Mother    Heart failure Mother    Hypertension Mother, Father    Kidney disease Mother          Tobacco Use    Smoking status: Not on file    Smokeless tobacco: Not on file   Substance and Sexual Activity    Alcohol use: Not on file    Drug use: Not on file    Sexual activity: Not on file         Review of Systems   Constitutional:  Positive for fatigue. Negative for appetite change, chills, fever and unexpected weight change.   HENT: Negative.     Eyes: Negative.    Respiratory: Negative.     Cardiovascular:  Positive for palpitations. Negative for chest pain and leg swelling.   Gastrointestinal: Negative.    Endocrine: Negative.    Genitourinary: Negative.    Musculoskeletal: Negative.    Allergic/Immunologic: Negative.     Neurological:  Positive for light-headedness and headaches.   Hematological: Negative.    Psychiatric/Behavioral: Negative.       Objective:     Vital Signs (Most Recent):  Temp: 98.3 °F (36.8 °C) (10/11/24 1940)  Pulse: 83 (10/11/24 2111)  Resp: 13 (10/11/24 2111)  BP: 110/62 (10/11/24 2110)  SpO2: 99 % (10/11/24 2111) Vital Signs (24h Range):  Temp:  [98.3 °F (36.8 °C)] 98.3 °F (36.8 °C)  Pulse:  [] 83  Resp:  [13-41] 13  SpO2:  [92 %-100 %] 99 %  BP: ()/(53-64) 110/62     Weight: 94.5 kg (208 lb 5.4 oz)  Body mass index is 28.26 kg/m².      Intake/Output Summary (Last 24 hours) at 10/11/2024 2141  Last data filed at 10/11/2024 2030  Gross per 24 hour   Intake 250 ml   Output --   Net 250 ml        Physical Exam  Vitals and nursing note reviewed.   Constitutional:       General: He is awake. He is not in acute distress.     Appearance: He is well-developed, well-groomed and overweight.   HENT:      Head: Normocephalic and atraumatic.      Nose: Nose normal.      Mouth/Throat:      Mouth: Mucous membranes are dry.      Pharynx: Oropharynx is clear.   Eyes:      Extraocular Movements: Extraocular movements intact.      Conjunctiva/sclera: Conjunctivae normal.      Pupils: Pupils are equal, round, and reactive to light.   Neck:      Vascular: No JVD.   Cardiovascular:      Rate and Rhythm: Normal rate and regular rhythm.      Pulses: Normal pulses.      Heart sounds: Normal heart sounds.   Pulmonary:      Effort: Pulmonary effort is normal.      Breath sounds: Normal breath sounds.   Abdominal:      General: Bowel sounds are normal. There is no distension.      Palpations: Abdomen is soft.      Tenderness: There is no abdominal tenderness. There is no guarding.   Musculoskeletal:      Cervical back: Normal range of motion and neck supple.      Right lower leg: No edema.      Left lower leg: No edema.   Skin:     General: Skin is warm and dry.      Capillary Refill: Capillary refill takes less than 2  seconds.   Neurological:      General: No focal deficit present.      Mental Status: He is alert.      GCS: GCS eye subscore is 4. GCS verbal subscore is 5. GCS motor subscore is 6.      Cranial Nerves: Cranial nerves 2-12 are intact.   Psychiatric:         Behavior: Behavior normal. Behavior is cooperative.          Vents:       Lines/Drains/Airways       Peripheral Intravenous Line  Duration                  Peripheral IV - Single Lumen 10/11/24 1904 18 G Left Antecubital <1 day         Peripheral IV - Single Lumen 10/11/24 1923 20 G Anterior;Left Wrist <1 day                  Significant Labs:    CBC/Anemia Profile:  Recent Labs   Lab 10/11/24  1926 10/11/24  1940 10/11/24  2111   WBC 4.45  --   --    HGB 17.5  --   --    HCT 53.0 44 42   PLT 77*  --   --    MCV 88  --   --    RDW 12.4  --   --        Chemistries:  Recent Labs   Lab 10/11/24  1926 10/11/24  2116    139   K 6.1* 3.8    104   CO2 19* 22*   BUN 26* 29*   CREATININE 6.1* 5.9*   CALCIUM 9.9 8.4*   ALBUMIN 3.9  --    PROT 8.4  --    BILITOT 1.0  --    ALKPHOS 67  --    ALT 20  --    AST 56*  --    MG 2.3  --      ABGs:   Recent Labs   Lab 10/11/24  2111   PH 7.595*   PCO2 33.7*   HCO3 32.7*   POCSATURATED 43   BE 11*     Coagulation:   Recent Labs   Lab 10/11/24  1926   INR 1.0     Troponin:   Recent Labs   Lab 10/11/24  1926   TROPONINI 0.026   BNP  Recent Labs   Lab 10/11/24  1926   *       All pertinent labs within the past 24 hours have been reviewed.    Significant Imaging:   I have reviewed all pertinent imaging results/findings within the past 24 hours.

## 2024-10-12 NOTE — ED PROVIDER NOTES
Emergency Medicine Provider Note - 10/11/2024    SCRIBE #1 NOTE: I, Samantha Danielle, am scribing for, and in the presence of,  Rain Zazueta DO. I have scribed the entire note.        History     Chief Complaint   Patient presents with    Tachycardia     Home dialysis today pulled off approx. 1L and went into -180s. En route given 24mg of adenosine (12mg twice) and 1 shock at 85 joules       Allergies:  Review of patient's allergies indicates:   Allergen Reactions    Lisinopril Anaphylaxis       History of Present Illness   HPI    10/11/2024, 7:20 PM  The history is provided by the patient  Additional history provided by an independent historian: [facility] paramedic at bedside    Jose Hunt is a 38 y.o. male with a PMHx of CKD (home dialysis) presenting to the ED via [facility] for evaluation of constant, severe SOB which onset at approximately 16:30 this afternoon. Associated symptoms include generalized chest pain and tightness as well as weakness to the LLE. No mitigating or exacerbating factors reported. The patient states that his symptoms began after completing his home dialysis session. Approximately 1L of fluid was dialyzed. Paramedic reports an elevated HR in the 170s on scene. 2 rounds of 12 mg Adenosine were administered with no change. A shock was administered at 85 J with no change. The patient denies any cardiac Hx. He denies any fever, abdominal pain, emesis, diarrhea, hematochezia, melena, and all other sxs at this time. No further complaints or concerns at this time.         Arrival mode: [facility]     PCP: No primary care provider on file.     Past Medical History:  No past medical history on file.    Past Surgical History:  No past surgical history on file.      Family History:  No family history on file.    Social History:  Social History     Tobacco Use    Smoking status: Not on file    Smokeless tobacco: Not on file   Substance and Sexual Activity    Alcohol  use: Not on file    Drug use: Not on file    Sexual activity: Not on file       I have reviewed the Past Medical History, Past Surgical History, Family History and Social History as documented above.      Review of Systems   Review of Systems   Constitutional:  Negative for fever.   HENT:  Negative for sore throat.    Respiratory:  Positive for chest tightness and shortness of breath.    Cardiovascular:  Positive for chest pain.   Gastrointestinal:  Negative for abdominal pain, blood in stool, diarrhea, nausea and vomiting.   Genitourinary:  Negative for dysuria.   Musculoskeletal:  Negative for back pain.   Skin:  Negative for rash.   Neurological:  Positive for weakness (LLE).   Hematological:  Does not bruise/bleed easily.   All other systems reviewed and are negative.     Physical Exam     Initial Vitals   BP Pulse Resp Temp SpO2   10/11/24 1910 10/11/24 1910 10/11/24 1910 10/11/24 1940 10/11/24 1910   (!) 112/53 (!) 169 (!) 41 98.3 °F (36.8 °C) 97 %      MAP       --                 Physical Exam    Nursing Notes and Vital Signs Reviewed.  Constitutional: Patient is in severe distress. Well-developed and well-nourished.  Patient appears anxious.  Patient is hyperventilating.  Head: Atraumatic. Normocephalic.  Eyes: PERRL. EOM intact. Conjunctivae are not pale. No scleral icterus.  ENT: Mucous membranes are dry. Oropharynx is clear and symmetric.    Neck: Supple. Full ROM. No lymphadenopathy.  Cardiovascular: Tachycardic. Regular rhythm. No murmurs, rubs, or gallops. Distal pulses are 2+ and symmetric.  Pulmonary/Chest: Respiratory distress. Tachypneic. Clear to auscultation bilaterally. No wheezing or rales.  Abdominal: Soft and non-distended.  There is no tenderness.  No rebound, guarding, or rigidity. Good bowel sounds.  Genitourinary: N/A  Musculoskeletal: Moves all extremities. No obvious deformities. No edema. No calf tenderness. Carpal-pedal spasm noted.  Skin: Warm and dry.  Neurological:  Alert,  awake, and appropriate.  Normal speech.  No acute focal neurological deficits are appreciated.  Psychiatric: Anxious. Good eye contact. Appropriate in content.     ED Course   ED Procedures:  Critical Care    Date/Time: 10/11/2024 7:48 PM    Performed by: Rain Abdullahi DO  Authorized by: Rain Abdullahi DO  Direct patient critical care time: 20 minutes  Additional history critical care time: 5 minutes  Ordering / reviewing critical care time: 10 minutes  Documentation critical care time: 5 minutes  Consulting other physicians critical care time: 5 minutes  Total critical care time (exclusive of procedural time) : 45 minutes  Critical care time was exclusive of separately billable procedures and treating other patients and teaching time.  Critical care was necessary to treat or prevent imminent or life-threatening deterioration of the following conditions: SVT.  Critical care was time spent personally by me on the following activities: blood draw for specimens, development of treatment plan with patient or surrogate, discussions with consultants, interpretation of cardiac output measurements, evaluation of patient's response to treatment, obtaining history from patient or surrogate, examination of patient, ordering and performing treatments and interventions, ordering and review of laboratory studies, ordering and review of radiographic studies, pulse oximetry and re-evaluation of patient's condition.        ED Vital Signs:  Vitals:    10/11/24 1910 10/11/24 1912 10/11/24 1928 10/11/24 1932   BP: (!) 112/53 (!) 89/64 96/62 (!) 92/54   Pulse: (!) 169 (!) 162 (!) 168 105   Resp: (!) 41 (!) 27 (!) 22 (!) 22   Temp:       SpO2: 97% (!) 92% 100% 100%   Weight:  94.5 kg (208 lb 5.4 oz)     Height:   6' (1.829 m)     10/11/24 1940 10/11/24 1950   BP: (!) 119/54 (!) 107/54   Pulse: 95 93   Resp: (!) 23 20   Temp: 98.3 °F (36.8 °C)    SpO2: 100% 100%   Weight:     Height:         All Lab Results:  Results for orders  placed or performed during the hospital encounter of 10/11/24   CBC auto differential    Collection Time: 10/11/24  7:26 PM   Result Value Ref Range    WBC 4.45 3.90 - 12.70 K/uL    RBC 6.03 4.60 - 6.20 M/uL    Hemoglobin 17.5 14.0 - 18.0 g/dL    Hematocrit 53.0 40.0 - 54.0 %    MCV 88 82 - 98 fL    MCH 29.0 27.0 - 31.0 pg    MCHC 33.0 32.0 - 36.0 g/dL    RDW 12.4 11.5 - 14.5 %    Platelets 77 (L) 150 - 450 K/uL    MPV 9.1 (L) 9.2 - 12.9 fL    Immature Granulocytes 0.4 0.0 - 0.5 %    Gran # (ANC) 2.7 1.8 - 7.7 K/uL    Immature Grans (Abs) 0.02 0.00 - 0.04 K/uL    Lymph # 1.1 1.0 - 4.8 K/uL    Mono # 0.4 0.3 - 1.0 K/uL    Eos # 0.1 0.0 - 0.5 K/uL    Baso # 0.05 0.00 - 0.20 K/uL    nRBC 0 0 /100 WBC    Gran % 61.4 38.0 - 73.0 %    Lymph % 25.2 18.0 - 48.0 %    Mono % 9.9 4.0 - 15.0 %    Eosinophil % 2.0 0.0 - 8.0 %    Basophil % 1.1 0.0 - 1.9 %    Differential Method Automated    Comprehensive metabolic panel    Collection Time: 10/11/24  7:26 PM   Result Value Ref Range    Sodium 137 136 - 145 mmol/L    Potassium 6.1 (H) 3.5 - 5.1 mmol/L    Chloride 100 95 - 110 mmol/L    CO2 19 (L) 23 - 29 mmol/L    Glucose 95 70 - 110 mg/dL    BUN 26 (H) 6 - 20 mg/dL    Creatinine 6.1 (H) 0.5 - 1.4 mg/dL    Calcium 9.9 8.7 - 10.5 mg/dL    Total Protein 8.4 6.0 - 8.4 g/dL    Albumin 3.9 3.5 - 5.2 g/dL    Total Bilirubin 1.0 0.1 - 1.0 mg/dL    Alkaline Phosphatase 67 55 - 135 U/L    AST 56 (H) 10 - 40 U/L    ALT 20 10 - 44 U/L    eGFR 11 (A) >60 mL/min/1.73 m^2    Anion Gap 18 (H) 8 - 16 mmol/L   Troponin I    Collection Time: 10/11/24  7:26 PM   Result Value Ref Range    Troponin I 0.026 0.000 - 0.026 ng/mL   TSH    Collection Time: 10/11/24  7:26 PM   Result Value Ref Range    TSH 0.526 0.400 - 4.000 uIU/mL   Brain natriuretic peptide    Collection Time: 10/11/24  7:26 PM   Result Value Ref Range     (H) 0 - 99 pg/mL   Protime-INR    Collection Time: 10/11/24  7:26 PM   Result Value Ref Range    Prothrombin Time 11.9 9.0 -  12.5 sec    INR 1.0 0.8 - 1.2   Magnesium    Collection Time: 10/11/24  7:26 PM   Result Value Ref Range    Magnesium 2.3 1.6 - 2.6 mg/dL   ISTAT PROCEDURE    Collection Time: 10/11/24  7:40 PM   Result Value Ref Range    POC PH 7.787 (HH) 7.35 - 7.45    POC PCO2 13.5 (LL) 35 - 45 mmHg    POC PO2 116 (H) 80 - 100 mmHg    POC HCO3 20.4 (L) 24 - 28 mmol/L    POC BE 2 -2 to 2 mmol/L    POC SATURATED O2 100 95 - 100 %    POC Glucose 113 (H) 70 - 110 mg/dL    POC Sodium 136 136 - 145 mmol/L    POC Potassium 3.2 (L) 3.5 - 5.1 mmol/L    POC Ionized Calcium 0.98 (L) 1.06 - 1.42 mmol/L    POC Hematocrit 44 36 - 54 %PCV    Sample ARTERIAL     Site RR     Allens Test Pass     DelSys Room Air     Mode SPONT     FiO2 21        The EKG was ordered, reviewed, and independently interpreted by the ED provider:  ECG Results              EKG 12-lead (Preliminary result)  Result time 10/11/24 19:37:10      Wet Read by Rain Abdullahi DO (10/11/24 19:37:10, [facility] Emergency Medicine)    Rate of 169.  Supraventricular tachycardia.  Occasional PVCs.  No ST segment elevation.  No STEMI                                    ECG Results              EKG 12-lead (Preliminary result)  Result time 10/11/24 19:37:10      Wet Read by Rain Abdullahi DO (10/11/24 19:37:10, [facility] Emergency Medicine)    Rate of 169.  Supraventricular tachycardia.  Occasional PVCs.  No ST segment elevation.  No STEMI                                    Imaging Results:  Imaging Results              X-Ray Chest AP Portable (Final result)  Result time 10/11/24 19:52:23      Final result by Elvin Cody MD (10/11/24 19:52:23)                   Impression:      No acute abnormality.      Electronically signed by: Elvin Cody  Date:    10/11/2024  Time:    19:52               Narrative:    EXAMINATION:  XR CHEST AP PORTABLE    CLINICAL HISTORY:  Dyspnea, unspecified    TECHNIQUE:  Single frontal view of the chest was  "performed.    COMPARISON:  None    FINDINGS:  The lungs are clear, with normal appearance of pulmonary vasculature and no pleural effusion or pneumothorax.    The cardiac silhouette is normal in size. The hilar and mediastinal contours are unremarkable.  Stent in the right axillary region    Bones are intact.                                            The Emergency Provider reviewed the vital signs and test results, which are outlined above.     ED Discussion   ED Medication(s):  Medications   sodium chloride 0.9% bolus 250 mL 250 mL (250 mLs Intravenous New Bag 10/11/24 1929)   metoprolol injection 5 mg (5 mg Intravenous Given 10/11/24 1929)       ED Course as of 10/11/24 2301   Fri Oct 11, 2024   1935 Hemoglobin: 17.5 [LB]   1935 Hematocrit: 53.0  Patient's heart rate decreased to 96 after 5 mg of metoprolol. [LB]   2011 Potassium(!): 6.1 [LB]   2011 Creatinine(!): 6.1 [LB]   2011 BUN(!): 26 [LB]   2014 Magnesium : 2.3 [LB]   2045 Spoke with Rajni Cummins NP:   requests repeat VBG and  K+ [LB]   2112 Patient evaluated by nurse practitioner Rajni Cummins NP:   Recommends ICU. [LB]      ED Course User Index  [LB] Rain Abdullahi, DO       7:27 PM: Discussed pt's case with Dr. Roberts (Cardiology) who recommends Metoprolol.  Echo, observation.    7:38 PM: Administered 5 mg Metoprolol with improvement of HR to 96.  Re-evaluated patient.  Patient does not have any focal deficits.  Finger-to-nose intact.  Negative heel drop.  Clinically I am not suspicious of CVA.  More likely patient developed carpopedal spasm.    8:21 PM: Discussed pt's case with Dr. Clifton ([facility]) who recommends Critical Care consult.    21"12 PM: Discussed case with Rajni Cummins ([facility]). Dr. Lamb agrees with current care and management of pt and accepts admission.   Admitting Service: Critical care  Admitting Physician: Dr. Lamb  Admit to: ICU    21:12  PM: Re-evaluated pt. I have discussed test results, " shared treatment plan, and the need for admission with patient and family at bedside. Pt and family express understanding at this time and agree with all information. All questions answered. Pt and family have no further questions or concerns at this time. Pt is ready for admit.       MIPS Measures     Smoker? No     Hypertension: Patient did not have any elevated blood pressures in the Emergency Department.     Medical Decision Making                 Medical Decision Making  Differential diagnosis: SVT, disequilibrium syndrome, dehydration, symptomatic anemia, electrolyte abnormality    ED course: Patient placed on monitor.  He was noted to be tachycardic with heart rates in the high 160s.  Patient was given gentle fluid bolus of 250 cc normal saline.  Cardiology consulted.  Recommended metoprolol 5 mg IV.  Patient did have improvement of his heart rate after administration of metoprolol.  Heart rate decreased to 96 beats per minute.  Repeat EKG showed normal sinus rhythm.  No acute ST or T-wave changes.  No hyperacute T-waves.  Patient had ABG obtained ?  Results.  PH 7.787/pCO2 13.5/20.4/2  POC potassium 3.2.  White cell count normal.  Normal hemoglobin/hematocrit.  CMP shows potassium of 6.1.  Clinically, patient does not have hyperacute T-waves.  Will repeat potassium before initiating Lokelma.  Troponin 0.026.  TSH 0.5-6.  .  Mag 2.3.  Chest x-ray no infiltrate.  Secure chat with Hospital Medicine Service.  Recommended patient be placed in the ICU.  Discussion face-to-face with Rajni Cummins NP she recommends admission to the ICU.  Repeat VBG 7.595.  POC potassium 4.1.  Phosphorus 1.6.  Mag 2.1.  Repeat BNP potassium 3.8.    Amount and/or Complexity of Data Reviewed  Independent Historian: EMS     Details: See HPI.  Labs: ordered. Decision-making details documented in ED Course.  Radiology: ordered. Decision-making details documented in ED Course.  ECG/medicine tests: ordered and independent  "interpretation performed. Decision-making details documented in ED Course.  Discussion of management or test interpretation with external provider(s): Discussed verbally with Rajni Cummins NP with critical Care    Risk  Prescription drug management.  Decision regarding hospitalization.    Critical Care  Total time providing critical care: 45 minutes      Prescription Management: I performed a review of the patient's current Rx medication list as input by nursing staff.    Patient's Medications    No medications on file        Discussed case verbally with:Critical Care Medicine    Referrals:  No orders of the defined types were placed in this encounter.      Portions of this note may have been created with voice recognition software. Occasional "wrong-word" or "sound-a-like" substitutions may have occurred due to the inherent limitations of voice recognition software. Please, read the note carefully and recognize, using context, where substitutions have occurred.          Clinical Impression       ICD-10-CM ICD-9-CM   1. Tachycardia  R00.0 785.0   2. Dyspnea  R06.00 786.09   3. Cardiac rhythm disorder or disturbance or change  I49.9 427.9         ED Disposition     Disposition: Admit to CCU/ICU  Patient condition: Stable          Scribe Attestation:   Scribe #1: I performed the above scribed service and the documentation accurately describes the services I performed. I attest to the accuracy of the note.    Attending Attestation:           Physician Attestation for Scribe:  Physician Attestation Statement for Scribe #1: I, Rain Zazueta, , reviewed documentation, as scribed by Samantha Danielle in my presence, and it is both accurate and complete.                    Rain Abdullahi DO  10/11/24 2309    "

## 2024-10-12 NOTE — PLAN OF CARE
Problem: Adult Inpatient Plan of Care  Goal: Plan of Care Review  Outcome: Progressing  Flowsheets (Taken 10/12/2024 7958)  Plan of Care Reviewed With: patient   Pt arrived to unit from ED at 2200. Pt c/o unable to urinate overnight unsuccessful attempts to I&O catherization x3. Urine output 300cc overnight. Pt given flomax for urinary retention. 1 BM. C/o back pain and HA. PRN tylenol and oxycodone administered. See MAR for times. Pt up and ambulatory to toilet independently. Pt remain SR HR 70-80s since admission in ICU. Afebrile. Normotensive BP overnight. Call light within reach. Pt verbalized understanding to call for assistance.

## 2024-10-12 NOTE — DISCHARGE SUMMARY
[facility] ([facility])  Pulmonology  Discharge Summary      Patient Name: Jose Hunt  MRN: [id number]  Admission Date: 10/11/2024  Hospital Length of Stay: 1 days  Discharge Date and Time:  10/12/2024 3:06 PM  Attending Physician: Josette Lamb MD   Discharging Provider: Josette Lamb MD  Primary Care Provider: Melva, Primary Doctor    HPI/ hospital course : Jose Hunt is a 38-year-old male with a PMHx of PCKD, ESRD-on HD MWF at home, followed by Dr. Lorenzo, chronic systolic heart failure (recent EF 55%), HLD, GERD, obesity, s/p sleeve gastrectomy, MDD, JOHNIE, hypokalemia-not on supplement, who presented to the ED by EMS for evaluation of palpitations and tachycardia onset during dialysis at home. Patient states he had been in his usual state of health until approximately residential through his 3.5-4 hr treatment when he felt dizzy and noted his heart rate 180-190. UF today 1 L. He re-infused and gave himself a 100 cc NS bolus. There were no changes to his UF rate of 400. He does make urine and reports 4-5 voids/day. He denies any decreased oral or solute intake, syncope, vision change, focal neurological symptoms, chest pain, abdominal pain, n/v/d, edema, bleeding, urinary symptoms. This has never happened before and he reports no arrhythmia history. He states he became very anxious with palpitations. EMS was called. He was given 2 doses of 12 mg Adenosine w/no response. He was cardioverted at 85 J, also with no change.      Initial EKG on arrival showed SVT, no STEMI. Patient had been hyperventilating since prior to arrival and arrived with carpal pedal spasms. He was treated with 5 mg metoprolol IV with return to SR in 90's. Hyperventilation and spasms resolved with termination of SVT. CXR w/naf. Initial ABG with respiratory alkalosis-7.787/13.5/116/20.4/100%. Repeat VBG showed 7.595/33.7/32.7. CBC unrevealing. Chemistry showed K 6.1, CO2 19, AG 18, BUN 26, Cr 6.1, which directly conflicted  with K noted on ABG of 3.2. Serum potassium repeated showing <2. K on VBG 4.1, which was verified by repeat BMP showing K 3.8. Patient showed no signs of heart failure exacerbation or stroke. Cardiology was consulted by ED and recommended Toprol XL, admit, echo in am. Due to inconsistencies in K, concern for tachycardia other arrhythmias and electrolyte derangements, CC consulted for admission.    No further episodes of svt   Echo done   Cardiology recommended metoprolol xl 12.5 daily  At discharge patient with no complaints  Ambulating with cane        * No surgery found *    Indwelling Lines/Drains at Time of Discharge:   Lines/Drains/Airways       Drain  Duration                  Hemodialysis AV Fistula Right upper arm -- days                    Consults (From admission, onward)          Status Ordering Provider     Inpatient consult to Hospitalist  Once        Provider:  (Not yet assigned)    Acknowledged MARNI SINGLETON     Inpatient consult to Nephrology  Once        Provider:  Jose L Lorenzo MD    Acknowledged MARNI SINGLETON     Inpatient consult to Cardiology  Once        Provider:  Nelida Roberts MD    Acknowledged NORMAN RENEE            Significant Labs:  All pertinent labs within the past 24 hours have been reviewed.    Significant Imaging:  I have reviewed all pertinent imaging results/findings within the past 24 hours.    Pending Diagnostic Studies:       None          Final Active Diagnoses:    Diagnosis Date Noted POA    PRINCIPAL PROBLEM:  Paroxysmal SVT (supraventricular tachycardia) [I47.10] 10/11/2024 Yes    Acute on chronic urinary retention [R33.9] 10/12/2024 No    Hyperkalemia [E87.5] 10/11/2024 Yes    ESRD (end stage renal disease) [N18.6] 10/11/2024 Yes     Chronic    Acute respiratory alkalosis [E87.3] 10/11/2024 Yes    Hypothyroidism, unspecified [E03.9]  Yes     Chronic    Mixed hyperlipidemia [E78.2]  Yes     Chronic    Chronic systolic heart failure [I50.22]  2014 Yes     Chronic      Problems Resolved During this Admission:       Discharged Condition: good    Disposition: Home or Self Care    Follow Up:    Patient Instructions:   No discharge procedures on file.  Medications:  Reconciled Home Medications:      Medication List        START taking these medications      metoprolol succinate 25 MG 24 hr tablet  Commonly known as: TOPROL-XL  Take 0.5 tablets (12.5 mg total) by mouth once daily.  Start taking on: October 13, 2024            CONTINUE taking these medications      alfuzosin 10 mg Tb24  Commonly known as: UROXATRAL  Take 10 mg by mouth daily with breakfast.     ascorbic Acid 500 mg Cpsr  Commonly known as: VITAMIN C  Take 500 mg by mouth once daily.     aspirin 81 MG EC tablet  Commonly known as: ECOTRIN  Take 81 mg by mouth once daily.     atorvastatin 20 MG tablet  Commonly known as: LIPITOR  Take 20 mg by mouth once daily.     AURYXIA 210 mg iron Tab  Generic drug: ferric citrate  Take 420 mg by mouth 3 (three) times daily.     buPROPion 300 MG 24 hr tablet  Commonly known as: WELLBUTRIN XL  Take 300 mg by mouth once daily.     calcitRIOL 0.5 MCG Cap  Commonly known as: ROCALTROL  Take 0.5 mcg by mouth once daily.     cinacalcet 30 MG Tab  Commonly known as: SENSIPAR  Take 30 mg by mouth daily with breakfast.     cyanocobalamin 1000 MCG tablet  Commonly known as: VITAMIN B-12  Take 100 mcg by mouth once daily.     cyclobenzaprine 10 MG tablet  Commonly known as: FLEXERIL  Take 10 mg by mouth 3 (three) times daily as needed for Muscle spasms.     docusate sodium 100 MG capsule  Commonly known as: COLACE  Take 100 mg by mouth once daily.     esomeprazole 40 MG capsule  Commonly known as: NEXIUM  Take 40 mg by mouth before breakfast.     famotidine 20 MG tablet  Commonly known as: PEPCID  Take 20 mg by mouth once daily.     fexofenadine 180 MG tablet  Commonly known as: ALLEGRA  Take 180 mg by mouth once daily.     FLUoxetine 40 MG capsule  Take 40 mg by mouth  once daily.     lanthanum 1000 MG chewable tablet  Commonly known as: FOSRENOL  Take 1,000 mg by mouth every meal as needed.     levothyroxine 125 MCG tablet  Commonly known as: SYNTHROID  Take 125 mcg by mouth before breakfast.     LINZESS 145 mcg Cap capsule  Generic drug: linaCLOtide  Take 145 mcg by mouth before breakfast.     MOUNJARO 12.5 mg/0.5 mL Pnij  Generic drug: tirzepatide  Inject 12.5 mg into the skin once a week. On Saturdays.     MOVANTIK ORAL  Take by mouth.     oxyCODONE 5 MG immediate release tablet  Commonly known as: ROXICODONE  Take 15 mg by mouth every 4 (four) hours as needed for Pain.     RENAPLEX-D 800 mcg-12.5 mg -2,000 unit Tab  Generic drug: vit B,C-FA-zinc-selen-vit D3-E  Take 1 tablet by mouth once daily.     tadalafiL 10 MG tablet  Commonly known as: CIALIS  Take 10 mg by mouth daily as needed for Erectile Dysfunction.     vitamin D 1000 units Tab  Commonly known as: VITAMIN D3  Take 5,000 Units by mouth once daily.            STOP taking these medications      BARIATRIC MULTIVITAMINS 45 mg iron- 800 mcg-120 mcg Cap  Generic drug: multivitamin-min-iron-FA-vit K     calcium carbonate 600 mg calcium (1,500 mg) Tab  Commonly known as: OS-IBRAHIMA     gabapentin 300 MG capsule  Commonly known as: NEURONTIN     mecobalamin (vitamin B12) 1,000 mcg Chew     STRESS FORMULA WITH ZINC tablet  Generic drug: multivit,stress formula-zinc     tiZANidine 4 mg Cap              Josette Lamb MD  Pulmonology  '[facility] ([facility])

## 2024-10-12 NOTE — CONSULTS
Jose Hunt is a 38 year old male with a PMHx of CKD who presents after a SVT episode. Patient reports having family issues of cardiac issues. His father had a pacemaker and defibrillator due to afib and his mother had a CABG x4. [place] doesn't have any cardiology issues or diagnoses. Symptoms included chest pain and tightness , dizziness, loss of vision and weakness/ numbness everywhere in his body. Patient states he began to have symptoms while receiving dialysis at his home. He reported of having elevated heart rate and tightness in his chest MCC through his dialysis session. He checked his blood pressure and heart rate after symptoms began and noted his heart rate was elevated and blood pressure was at it's baseline. He used 100 cc of saline to help combat symptoms and reported that it worsened his symptoms by dropping his blood pressure and elevating his heart rate even more. Patient reported stopping dialysis and calling paramedics.Paramedic reports an elevated HR in the 170s on scene. Adenosine 12 mg was administered twice and he was shocked at 85 J both did not have any change. Patient denies any recent infections, new medication, and drinking any energy drinks in the past 2-3 days. Patient is not with any symptoms at the moment. Currently patient was administered metoprolol for aide in episode of SVT, which was reported to help. Recommend being placed on a baby dose of metoprolol or proceeding with an ablation procedure. Troponin has trended down from 0.348 to 0.288. BNP elevated at 172.     ECHO:Left Ventricle: The left ventricle is normal in size. Normal wall thickness. Normal wall motion. There is normal systolic function with a visually estimated ejection fraction of 60 - 65%. Ejection fraction is approximately 65%. There is normal diastolic function.    Right Ventricle: Normal right ventricular cavity size. Wall thickness is normal. Right ventricle wall motion   is normal. Systolic function is normal.    Pulmonary Artery: The estimated pulmonary artery systolic pressure is 19 mmHg.    IVC/SVC: Normal venous pressure at 3 mmHg.    EKG:  Sinus rhythm.  Rate 91 beats per minute.  Normal axis.  No ST segment elevation.  No STEMI.  No hyperacute T-waves.

## 2024-10-12 NOTE — HPI
Jose Hunt is a 38-year-old male with a PMHx of PCKD, ESRD-on HD MWF at home, followed by Dr. Lorenzo, chronic systolic heart failure (recent EF 55%), HLD, GERD, obesity, s/p sleeve gastrectomy, MDD, JOHNIE, hypokalemia-not on supplement, who presented to the ED by EMS for evaluation of palpitations and tachycardia onset during dialysis at home. Patient states he had been in his usual state of health until approximately alf through his 3.5-4 hr treatment when he felt dizzy and noted his heart rate 180-190. UF today 1 L. He re-infused and gave himself a 100 cc NS bolus. There were no changes to his UF rate of 400. He does make urine and reports 4-5 voids/day. He denies any decreased oral or solute intake, syncope, vision change, focal neurological symptoms, chest pain, abdominal pain, n/v/d, edema, bleeding, urinary symptoms. This has never happened before and he reports no arrhythmia history. He states he became very anxious with palpitations. EMS was called. He was given 2 doses of 12 mg Adenosine w/no response. He was cardioverted at 85 J, also with no change.     Initial EKG on arrival showed SVT, no STEMI. Patient had been hyperventilating since prior to arrival and arrived with carpal pedal spasms. He was treated with 5 mg metoprolol IV with return to SR in 90's. Hyperventilation and spasms resolved with termination of SVT. CXR w/naf. Initial ABG with respiratory alkalosis-7.787/13.5/116/20.4/100%. Repeat VBG showed 7.595/33.7/32.7. CBC unrevealing. Chemistry showed K 6.1, CO2 19, AG 18, BUN 26, Cr 6.1, which directly conflicted with K noted on ABG of 3.2. Serum potassium repeated showing <2. K on VBG 4.1, which was verified by repeat BMP showing K 3.8. Patient showed no signs of heart failure exacerbation or stroke. Cardiology was consulted by ED and recommended Toprol XL, admit, echo in am. Due to inconsistencies in K, concern for tachycardia other arrhythmias and electrolyte derangements, CC consulted for  admission.

## 2024-10-13 LAB
OHS QRS DURATION: 104 MS
OHS QRS DURATION: 106 MS
OHS QTC CALCULATION: 487 MS
OHS QTC CALCULATION: 499 MS

## 2024-10-13 NOTE — CONSULTS
Form at in-basket for signature please. Thank you!   [facility] ([facility])  Cardiology  Consult Note    Patient Name: Jose Hunt  MRN: [id number]  Admission Date: 10/11/2024  Hospital Length of Stay: 1 days  Code Status: Prior   Attending Provider: No att. providers found   Consulting Provider: Nelida Roberts MD  Primary Care Physician: Melva, Primary Doctor  Principal Problem:Paroxysmal SVT (supraventricular tachycardia)    Patient information was obtained from patient and ER records.     Consults  Subjective:     Chief Complaint:  palpitations     HPI:   Jose Hunt is a 38 year old male with a PMHx of ESRD who presents after a SVT episode. Patient reports having family issues of cardiac issues. His father had a pacemaker and defibrillator due to afib and his mother had a CABG x4. [place] Patient doesn't have any cardiology issues or diagnoses. Symptoms included chest pain and tightness , dizziness, loss of vision and weakness/ numbness everywhere in his body. Patient states he began to have symptoms while receiving dialysis at his home. He reported of having elevated heart rate and tightness in his chest longterm through his dialysis session. He checked his blood pressure and heart rate after symptoms began and noted his heart rate was elevated and blood pressure was at it's baseline. He used 100 cc of saline to help combat symptoms and reported that it worsened his symptoms by dropping his blood pressure and elevating his heart rate even more. Patient reported stopping dialysis and calling paramedics.Paramedic reports an elevated HR in the 170s on scene. Adenosine 12 mg  was administered twice and he was shocked at 85 J both did not have any change. Patient denies any recent infections, new medication, and drinking any energy drinks in the past 2-3 days. Patient is not with any symptoms at the moment. Currently patient was administered metoprolol for aide in episode of SVT, which was reported to help. Recommend being  placed on a baby dose of metoprolol or proceeding with an ablation procedure. Troponin has trended down from 0.348 to 0.288. BNP elevated at 172.      ECHO:Left Ventricle: The left ventricle is normal in size. Normal wall thickness. Normal wall motion. There is normal systolic function with a visually estimated ejection fraction of 60 - 65%. Ejection fraction is approximately 65%. There is normal diastolic function.    Right Ventricle: Normal right ventricular cavity size. Wall thickness is normal. Right ventricle wall motion  is normal. Systolic function is normal.    Pulmonary Artery: The estimated pulmonary artery systolic pressure is 19 mmHg.    IVC/SVC: Normal venous pressure at 3 mmHg.     EKG:  Sinus rhythm.  Rate 91 beats per minute.  Normal axis.  No ST segment elevation.  No STEMI.  No hyperacute T-waves.         Past Medical History:   Diagnosis Date    Aortic atherosclerosis 2016    BPH (benign prostatic hyperplasia)     Chronic kidney disease, unspecified     Chronic systolic heart failure 2014    DDD (degenerative disc disease), cervical 2015    DDD (degenerative disc disease), lumbar 2015    Diverticulosis     ESRD (end stage renal disease) on dialysis     GERD (gastroesophageal reflux disease) 2016    Hypokalemia     Hypothyroidism, unspecified     Major depressive disorder, single episode, unspecified 2015    Mixed hyperlipidemia     Obesity, unspecified     JOHNIE (obstructive sleep apnea)     Polycystic kidney, unspecified 2018    Primary osteoarthritis of both knees        Past Surgical History:   Procedure Laterality Date    AV FISTULA PLACEMENT Right     CHOLECYSTECTOMY  2008    Excess skin removal  2019    INGUINAL HERNIA REPAIR  2019    LAPAROSCOPIC SLEEVE GASTRECTOMY  2016    ORIF FEMUR DECOMPRESSION Right 1997    ROTATOR CUFF REPAIR      TONSILLECTOMY         Review of patient's allergies indicates:   Allergen Reactions    Lisinopril Anaphylaxis    Penicillins Anaphylaxis       No current  facility-administered medications on file prior to encounter.     Current Outpatient Medications on File Prior to Encounter   Medication Sig    lanthanum (FOSRENOL) 1000 MG chewable tablet Take 1,000 mg by mouth every meal as needed.    MOUNJARO 12.5 mg/0.5 mL PnIj Inject 12.5 mg into the skin once a week. On Saturdays.    alfuzosin (UROXATRAL) 10 mg Tb24 Take 10 mg by mouth daily with breakfast.    ascorbic Acid (VITAMIN C) 500 mg CpSR Take 500 mg by mouth once daily.    aspirin (ECOTRIN) 81 MG EC tablet Take 81 mg by mouth once daily.    atorvastatin (LIPITOR) 20 MG tablet Take 20 mg by mouth once daily.    buPROPion (WELLBUTRIN XL) 300 MG 24 hr tablet Take 300 mg by mouth once daily.    calcitRIOL (ROCALTROL) 0.5 MCG Cap Take 0.5 mcg by mouth once daily.    cinacalcet (SENSIPAR) 30 MG Tab Take 30 mg by mouth daily with breakfast.    cyanocobalamin (VITAMIN B-12) 1000 MCG tablet Take 100 mcg by mouth once daily.    cyclobenzaprine (FLEXERIL) 10 MG tablet Take 10 mg by mouth 3 (three) times daily as needed for Muscle spasms.    docusate sodium (COLACE) 100 MG capsule Take 100 mg by mouth once daily.    esomeprazole (NEXIUM) 40 MG capsule Take 40 mg by mouth before breakfast.    famotidine (PEPCID) 20 MG tablet Take 20 mg by mouth once daily.    ferric citrate (AURYXIA) 210 mg iron Tab Take 420 mg by mouth 3 (three) times daily.    fexofenadine (ALLEGRA) 180 MG tablet Take 180 mg by mouth once daily.    FLUoxetine 40 MG capsule Take 40 mg by mouth once daily.    levothyroxine (SYNTHROID) 125 MCG tablet Take 125 mcg by mouth before breakfast.    linaCLOtide (LINZESS) 145 mcg Cap capsule Take 145 mcg by mouth before breakfast.    naloxegol oxalate (MOVANTIK ORAL) Take by mouth.    oxyCODONE (ROXICODONE) 5 MG immediate release tablet Take 15 mg by mouth every 4 (four) hours as needed for Pain.    tadalafiL (CIALIS) 10 MG tablet Take 10 mg by mouth daily as needed for Erectile Dysfunction.    vit B,C-FA-zinc-selen-vit  D3-E (RENAPLEX-D) 800 mcg-12.5 mg -2,000 unit Tab Take 1 tablet by mouth once daily.    vitamin D (VITAMIN D3) 1000 units Tab Take 5,000 Units by mouth once daily.    [DISCONTINUED] calcium carbonate (OS-IBRAHIMA) 600 mg calcium (1,500 mg) Tab Take 600 mg by mouth once.    [DISCONTINUED] gabapentin (NEURONTIN) 300 MG capsule Take 300 mg by mouth 3 (three) times daily.    [DISCONTINUED] mecobalamin, vitamin B12, 1,000 mcg Chew Take 1,000 mg by mouth once daily.    [DISCONTINUED] multivit,stress formula-zinc (STRESS FORMULA WITH ZINC) Tab Take by mouth once daily.    [DISCONTINUED] multivitamin-min-iron-FA-vit K (BARIATRIC MULTIVITAMINS) 45 mg iron- 800 mcg-120 mcg Cap Take 1 tablet by mouth once daily.    [DISCONTINUED] tiZANidine 4 mg Cap Take 4 mg by mouth 3 (three) times daily as needed.     Family History       Problem Relation (Age of Onset)    Coronary artery disease Mother, Father    Diabetes Mother    Heart failure Mother    Hypertension Mother, Father    Kidney disease Mother          Tobacco Use    Smoking status: Never    Smokeless tobacco: Never   Substance and Sexual Activity    Alcohol use: Not on file    Drug use: Never    Sexual activity: Not on file     Review of Systems   Constitutional: Positive for malaise/fatigue. Negative for diaphoresis, weight gain and weight loss.   HENT:  Negative for congestion and nosebleeds.    Cardiovascular:  Positive for dyspnea on exertion and irregular heartbeat. Negative for chest pain, claudication, cyanosis, leg swelling, near-syncope, orthopnea, palpitations, paroxysmal nocturnal dyspnea and syncope.   Respiratory:  Positive for shortness of breath. Negative for cough, hemoptysis, sleep disturbances due to breathing, snoring, sputum production and wheezing.    Hematologic/Lymphatic: Negative for bleeding problem. Does not bruise/bleed easily.   Skin:  Negative for rash.   Musculoskeletal:  Negative for arthritis, back pain, falls, joint pain, muscle cramps and muscle  weakness.   Gastrointestinal:  Negative for abdominal pain, constipation, diarrhea, heartburn, hematemesis, hematochezia, melena, nausea and vomiting.   Genitourinary:  Negative for dysuria, hematuria and nocturia.   Neurological:  Negative for excessive daytime sleepiness, dizziness, headaches, light-headedness, loss of balance, numbness, vertigo and weakness.     Objective:     Vital Signs (Most Recent):  Temp: 99 °F (37.2 °C) (10/12/24 1200)  Pulse: 68 (10/12/24 1400)  Resp: (!) 27 (10/12/24 1400)  BP: (!) 112/55 (10/12/24 1400)  SpO2: 97 % (10/12/24 1400) Vital Signs (24h Range):  Temp:  [98.2 °F (36.8 °C)-99 °F (37.2 °C)] 99 °F (37.2 °C)  Pulse:  [64-98] 68  Resp:  [12-41] 27  SpO2:  [95 %-100 %] 97 %  BP: ()/(50-71) 112/55     Weight: 92.5 kg (204 lb)  Body mass index is 27.67 kg/m².    SpO2: 97 %         Intake/Output Summary (Last 24 hours) at 10/12/2024 1936  Last data filed at 10/12/2024 0553  Gross per 24 hour   Intake 740.08 ml   Output 300 ml   Net 440.08 ml       Lines/Drains/Airways       Peripheral Intravenous Line  Duration                  Hemodialysis AV Fistula Right upper arm -- days                     Physical Exam  Vitals and nursing note reviewed.   Constitutional:       Appearance: Normal appearance. He is well-developed.   HENT:      Head: Normocephalic.      Mouth/Throat:      Mouth: Mucous membranes are moist.   Neck:      Vascular: No carotid bruit or JVD.   Cardiovascular:      Rate and Rhythm: Normal rate and regular rhythm.      Pulses: Normal pulses.      Heart sounds: Normal heart sounds. No murmur heard.     No friction rub.   Pulmonary:      Effort: Pulmonary effort is normal. No respiratory distress.      Breath sounds: Normal breath sounds. No wheezing or rales.   Abdominal:      General: Bowel sounds are normal. There is no distension.      Palpations: Abdomen is soft.      Tenderness: There is no abdominal tenderness. There is no guarding.   Musculoskeletal:          "General: No swelling or tenderness.      Cervical back: Neck supple. No tenderness.      Right lower leg: No edema.      Left lower leg: No edema.   Skin:     General: Skin is warm and dry.      Capillary Refill: Capillary refill takes less than 2 seconds.      Findings: No rash.   Neurological:      General: No focal deficit present.      Mental Status: He is alert and oriented to person, place, and time.   Psychiatric:         Mood and Affect: Mood normal.         Behavior: Behavior normal.         Thought Content: Thought content normal.          Significant Labs: BMP:   Recent Labs   Lab 10/11/24  1926 10/11/24  2116 10/12/24  0300   GLU 95 78 112*    139 140   K 6.1* 3.8 4.2    104 102   CO2 19* 22* 27   BUN 26* 29* 32*   CREATININE 6.1* 5.9* 6.6*   CALCIUM 9.9 8.4* 8.6*   MG 2.3 2.1 2.2   , CMP   Recent Labs   Lab 10/11/24  1926 10/11/24  2116 10/12/24  0300    139 140   K 6.1* 3.8 4.2    104 102   CO2 19* 22* 27   GLU 95 78 112*   BUN 26* 29* 32*   CREATININE 6.1* 5.9* 6.6*   CALCIUM 9.9 8.4* 8.6*   PROT 8.4  --  5.5*   ALBUMIN 3.9  --  3.1*   BILITOT 1.0  --  1.0   ALKPHOS 67  --  48*   AST 56*  --  13   ALT 20  --  10   ANIONGAP 18* 13 11   , CBC   Recent Labs   Lab 10/11/24  1926 10/11/24  1940 10/12/24  0300   WBC 4.45  --  5.67   HGB 17.5  --  14.1   HCT 53.0   < > 42.1   PLT 77*  --  71*    < > = values in this interval not displayed.   , INR   Recent Labs   Lab 10/11/24  1926   INR 1.0   , Lipid Panel No results for input(s): "CHOL", "HDL", "LDLCALC", "TRIG", "CHOLHDL" in the last 48 hours., and Troponin   Recent Labs   Lab 10/12/24  0801 10/12/24  1056 10/12/24  1416   TROPONINI 0.421* 0.348* 0.288*       Significant Imaging: Echocardiogram: 2D echo with color flow doppler: No results found for this or any previous visit. and Transthoracic echo (TTE) complete (Cupid Only):   Results for orders placed or performed during the hospital encounter of 10/11/24   Echo   Result Value " Ref Range    BSA 2.17 m2    LVOT stroke volume 76.4 cm3    LVIDd 5.2 3.5 - 6.0 cm    LV Systolic Volume 53.80 mL    LV Systolic Volume Index 25.0 mL/m2    LVIDs 3.6 2.1 - 4.0 cm    LV Diastolic Volume 131.89 mL    LV Diastolic Volume Index 61.34 mL/m2    Left Ventricular End Systolic Volume by Teichholz Method 53.80 mL    Left Ventricular End Diastolic Volume by Teichholz Method 131.89 mL    IVS 1.1 0.6 - 1.1 cm    LVOT diameter 2.3 cm    LVOT area 4.2 cm2    FS 30.8 28 - 44 %    Left Ventricle Relative Wall Thickness 0.42 cm    PW 1.1 0.6 - 1.1 cm    LV mass 220.8 g    LV Mass Index 102.7 g/m2    MV Peak E Colby 0.88 m/s    TDI LATERAL 0.18 m/s    TDI SEPTAL 0.13 m/s    E/E' ratio 5.68 m/s    MV Peak A Colby 0.60 m/s    TR Max Colby 1.98 m/s    E/A ratio 1.47     IVRT 53.28 msec    E wave deceleration time 178.41 msec    LV SEPTAL E/E' RATIO 6.77 m/s    LV LATERAL E/E' RATIO 4.89 m/s    LVOT peak colby 0.8 m/s    Left Ventricular Outflow Tract Mean Velocity 0.67 cm/s    Left Ventricular Outflow Tract Mean Gradient 1.89 mmHg    RVOT peak VTI 18.3 cm    TAPSE 2.42 cm    LA size 3.94 cm    Left Atrium Minor Axis 5.17 cm    Left Atrium Major Axis 4.88 cm    RA Major Axis 4.28 cm    AV mean gradient 4.3 mmHg    AV peak gradient 6.8 mmHg    Ao peak colby 1.3 m/s    Ao VTI 25.9 cm    LVOT peak VTI 18.4 cm    AV valve area 3.0 cm²    AV Velocity Ratio 0.62     AV index (prosthetic) 0.71     MICKY by Velocity Ratio 2.6 cm²    MV stenosis pressure 1/2 time 51.74 ms    MV valve area p 1/2 method 4.25 cm2    Triscuspid Valve Regurgitation Peak Gradient 16 mmHg    PV mean gradient 2 mmHg    RVOT peak colby 0.80 m/s    Ao root annulus 3.65 cm    STJ 3.96 cm    Ascending aorta 3.78 cm    IVC diameter 1.72 cm    Mean e' 0.16 m/s    ZLVIDS -1.32     ZLVIDD -2.94     ELIJAH 31.3 mL/m2    LA Vol 67.26 cm3    LA WIDTH 4.0 cm    RA Width 3.5 cm    EF 65 %    TV resting pulmonary artery pressure 19 mmHg    RV TB RVSP 5 mmHg    Est. RA pres 3 mmHg     Narrative      Left Ventricle: The left ventricle is normal in size. Normal wall   thickness. Normal wall motion. There is normal systolic function with a   visually estimated ejection fraction of 60 - 65%. Ejection fraction is   approximately 65%. There is normal diastolic function.    Right Ventricle: Normal right ventricular cavity size. Wall thickness   is normal. Right ventricle wall motion  is normal. Systolic function is   normal.    Pulmonary Artery: The estimated pulmonary artery systolic pressure is   19 mmHg.    IVC/SVC: Normal venous pressure at 3 mmHg.       Assessment and Plan:     * Paroxysmal SVT (supraventricular tachycardia)  Possibly 2/2 hyper/hypokalemia   Place on toprol xl 12.5 mg due to low BP  Would like to have ablation done  Will f/u with OP cardiologist  Avoid energy drinks    ESRD (end stage renal disease)  Does peritoneal dialysis   F/u with nephrology         VTE Risk Mitigation (From admission, onward)           Ordered     IP VTE LOW RISK PATIENT  Once         10/11/24 8359                    Thank you for your consult. I will sign off. Please contact us if you have any additional questions.    Nelida Roberts MD  Cardiology   [facility] ([facility])

## 2024-10-13 NOTE — ASSESSMENT & PLAN NOTE
Possibly 2/2 hyper/hypokalemia   Place on toprol xl 12.5 mg due to low BP  Would like to have ablation done  Will f/u with OP cardiologist  Avoid energy drinks

## 2024-10-13 NOTE — SUBJECTIVE & OBJECTIVE
Past Medical History:   Diagnosis Date    Aortic atherosclerosis 2016    BPH (benign prostatic hyperplasia)     Chronic kidney disease, unspecified     Chronic systolic heart failure 2014    DDD (degenerative disc disease), cervical 2015    DDD (degenerative disc disease), lumbar 2015    Diverticulosis     ESRD (end stage renal disease) on dialysis     GERD (gastroesophageal reflux disease) 2016    Hypokalemia     Hypothyroidism, unspecified     Major depressive disorder, single episode, unspecified 2015    Mixed hyperlipidemia     Obesity, unspecified     JOHNIE (obstructive sleep apnea)     Polycystic kidney, unspecified 2018    Primary osteoarthritis of both knees        Past Surgical History:   Procedure Laterality Date    AV FISTULA PLACEMENT Right     CHOLECYSTECTOMY  2008    Excess skin removal  2019    INGUINAL HERNIA REPAIR  2019    LAPAROSCOPIC SLEEVE GASTRECTOMY  2016    ORIF FEMUR DECOMPRESSION Right 1997    ROTATOR CUFF REPAIR      TONSILLECTOMY         Review of patient's allergies indicates:   Allergen Reactions    Lisinopril Anaphylaxis    Penicillins Anaphylaxis       No current facility-administered medications on file prior to encounter.     Current Outpatient Medications on File Prior to Encounter   Medication Sig    lanthanum (FOSRENOL) 1000 MG chewable tablet Take 1,000 mg by mouth every meal as needed.    MOUNJARO 12.5 mg/0.5 mL PnIj Inject 12.5 mg into the skin once a week. On Saturdays.    alfuzosin (UROXATRAL) 10 mg Tb24 Take 10 mg by mouth daily with breakfast.    ascorbic Acid (VITAMIN C) 500 mg CpSR Take 500 mg by mouth once daily.    aspirin (ECOTRIN) 81 MG EC tablet Take 81 mg by mouth once daily.    atorvastatin (LIPITOR) 20 MG tablet Take 20 mg by mouth once daily.    buPROPion (WELLBUTRIN XL) 300 MG 24 hr tablet Take 300 mg by mouth once daily.    calcitRIOL (ROCALTROL) 0.5 MCG Cap Take 0.5 mcg by mouth once daily.    cinacalcet (SENSIPAR) 30 MG Tab Take 30 mg by mouth daily with  breakfast.    cyanocobalamin (VITAMIN B-12) 1000 MCG tablet Take 100 mcg by mouth once daily.    cyclobenzaprine (FLEXERIL) 10 MG tablet Take 10 mg by mouth 3 (three) times daily as needed for Muscle spasms.    docusate sodium (COLACE) 100 MG capsule Take 100 mg by mouth once daily.    esomeprazole (NEXIUM) 40 MG capsule Take 40 mg by mouth before breakfast.    famotidine (PEPCID) 20 MG tablet Take 20 mg by mouth once daily.    ferric citrate (AURYXIA) 210 mg iron Tab Take 420 mg by mouth 3 (three) times daily.    fexofenadine (ALLEGRA) 180 MG tablet Take 180 mg by mouth once daily.    FLUoxetine 40 MG capsule Take 40 mg by mouth once daily.    levothyroxine (SYNTHROID) 125 MCG tablet Take 125 mcg by mouth before breakfast.    linaCLOtide (LINZESS) 145 mcg Cap capsule Take 145 mcg by mouth before breakfast.    naloxegol oxalate (MOVANTIK ORAL) Take by mouth.    oxyCODONE (ROXICODONE) 5 MG immediate release tablet Take 15 mg by mouth every 4 (four) hours as needed for Pain.    tadalafiL (CIALIS) 10 MG tablet Take 10 mg by mouth daily as needed for Erectile Dysfunction.    vit B,C-FA-zinc-selen-vit D3-E (RENAPLEX-D) 800 mcg-12.5 mg -2,000 unit Tab Take 1 tablet by mouth once daily.    vitamin D (VITAMIN D3) 1000 units Tab Take 5,000 Units by mouth once daily.    [DISCONTINUED] calcium carbonate (OS-IBRAHIMA) 600 mg calcium (1,500 mg) Tab Take 600 mg by mouth once.    [DISCONTINUED] gabapentin (NEURONTIN) 300 MG capsule Take 300 mg by mouth 3 (three) times daily.    [DISCONTINUED] mecobalamin, vitamin B12, 1,000 mcg Chew Take 1,000 mg by mouth once daily.    [DISCONTINUED] multivit,stress formula-zinc (STRESS FORMULA WITH ZINC) Tab Take by mouth once daily.    [DISCONTINUED] multivitamin-min-iron-FA-vit K (BARIATRIC MULTIVITAMINS) 45 mg iron- 800 mcg-120 mcg Cap Take 1 tablet by mouth once daily.    [DISCONTINUED] tiZANidine 4 mg Cap Take 4 mg by mouth 3 (three) times daily as needed.     Family History       Problem  Relation (Age of Onset)    Coronary artery disease Mother, Father    Diabetes Mother    Heart failure Mother    Hypertension Mother, Father    Kidney disease Mother          Tobacco Use    Smoking status: Never    Smokeless tobacco: Never   Substance and Sexual Activity    Alcohol use: Not on file    Drug use: Never    Sexual activity: Not on file     Review of Systems   Constitutional: Positive for malaise/fatigue. Negative for diaphoresis, weight gain and weight loss.   HENT:  Negative for congestion and nosebleeds.    Cardiovascular:  Positive for dyspnea on exertion and irregular heartbeat. Negative for chest pain, claudication, cyanosis, leg swelling, near-syncope, orthopnea, palpitations, paroxysmal nocturnal dyspnea and syncope.   Respiratory:  Positive for shortness of breath. Negative for cough, hemoptysis, sleep disturbances due to breathing, snoring, sputum production and wheezing.    Hematologic/Lymphatic: Negative for bleeding problem. Does not bruise/bleed easily.   Skin:  Negative for rash.   Musculoskeletal:  Negative for arthritis, back pain, falls, joint pain, muscle cramps and muscle weakness.   Gastrointestinal:  Negative for abdominal pain, constipation, diarrhea, heartburn, hematemesis, hematochezia, melena, nausea and vomiting.   Genitourinary:  Negative for dysuria, hematuria and nocturia.   Neurological:  Negative for excessive daytime sleepiness, dizziness, headaches, light-headedness, loss of balance, numbness, vertigo and weakness.     Objective:     Vital Signs (Most Recent):  Temp: 99 °F (37.2 °C) (10/12/24 1200)  Pulse: 68 (10/12/24 1400)  Resp: (!) 27 (10/12/24 1400)  BP: (!) 112/55 (10/12/24 1400)  SpO2: 97 % (10/12/24 1400) Vital Signs (24h Range):  Temp:  [98.2 °F (36.8 °C)-99 °F (37.2 °C)] 99 °F (37.2 °C)  Pulse:  [64-98] 68  Resp:  [12-41] 27  SpO2:  [95 %-100 %] 97 %  BP: ()/(50-71) 112/55     Weight: 92.5 kg (204 lb)  Body mass index is 27.67 kg/m².    SpO2: 97 %          Intake/Output Summary (Last 24 hours) at 10/12/2024 1936  Last data filed at 10/12/2024 0553  Gross per 24 hour   Intake 740.08 ml   Output 300 ml   Net 440.08 ml       Lines/Drains/Airways       Peripheral Intravenous Line  Duration                  Hemodialysis AV Fistula Right upper arm -- days                     Physical Exam  Vitals and nursing note reviewed.   Constitutional:       Appearance: Normal appearance. He is well-developed.   HENT:      Head: Normocephalic.      Mouth/Throat:      Mouth: Mucous membranes are moist.   Neck:      Vascular: No carotid bruit or JVD.   Cardiovascular:      Rate and Rhythm: Normal rate and regular rhythm.      Pulses: Normal pulses.      Heart sounds: Normal heart sounds. No murmur heard.     No friction rub.   Pulmonary:      Effort: Pulmonary effort is normal. No respiratory distress.      Breath sounds: Normal breath sounds. No wheezing or rales.   Abdominal:      General: Bowel sounds are normal. There is no distension.      Palpations: Abdomen is soft.      Tenderness: There is no abdominal tenderness. There is no guarding.   Musculoskeletal:         General: No swelling or tenderness.      Cervical back: Neck supple. No tenderness.      Right lower leg: No edema.      Left lower leg: No edema.   Skin:     General: Skin is warm and dry.      Capillary Refill: Capillary refill takes less than 2 seconds.      Findings: No rash.   Neurological:      General: No focal deficit present.      Mental Status: He is alert and oriented to person, place, and time.   Psychiatric:         Mood and Affect: Mood normal.         Behavior: Behavior normal.         Thought Content: Thought content normal.          Significant Labs: BMP:   Recent Labs   Lab 10/11/24  1926 10/11/24  2116 10/12/24  0300   GLU 95 78 112*    139 140   K 6.1* 3.8 4.2    104 102   CO2 19* 22* 27   BUN 26* 29* 32*   CREATININE 6.1* 5.9* 6.6*   CALCIUM 9.9 8.4* 8.6*   MG 2.3 2.1 2.2   , CMP  "  Recent Labs   Lab 10/11/24  1926 10/11/24  2116 10/12/24  0300    139 140   K 6.1* 3.8 4.2    104 102   CO2 19* 22* 27   GLU 95 78 112*   BUN 26* 29* 32*   CREATININE 6.1* 5.9* 6.6*   CALCIUM 9.9 8.4* 8.6*   PROT 8.4  --  5.5*   ALBUMIN 3.9  --  3.1*   BILITOT 1.0  --  1.0   ALKPHOS 67  --  48*   AST 56*  --  13   ALT 20  --  10   ANIONGAP 18* 13 11   , CBC   Recent Labs   Lab 10/11/24  1926 10/11/24  1940 10/12/24  0300   WBC 4.45  --  5.67   HGB 17.5  --  14.1   HCT 53.0   < > 42.1   PLT 77*  --  71*    < > = values in this interval not displayed.   , INR   Recent Labs   Lab 10/11/24  1926   INR 1.0   , Lipid Panel No results for input(s): "CHOL", "HDL", "LDLCALC", "TRIG", "CHOLHDL" in the last 48 hours., and Troponin   Recent Labs   Lab 10/12/24  0801 10/12/24  1056 10/12/24  1416   TROPONINI 0.421* 0.348* 0.288*       Significant Imaging: Echocardiogram: 2D echo with color flow doppler: No results found for this or any previous visit. and Transthoracic echo (TTE) complete (Cupid Only):   Results for orders placed or performed during the hospital encounter of 10/11/24   Echo   Result Value Ref Range    BSA 2.17 m2    LVOT stroke volume 76.4 cm3    LVIDd 5.2 3.5 - 6.0 cm    LV Systolic Volume 53.80 mL    LV Systolic Volume Index 25.0 mL/m2    LVIDs 3.6 2.1 - 4.0 cm    LV Diastolic Volume 131.89 mL    LV Diastolic Volume Index 61.34 mL/m2    Left Ventricular End Systolic Volume by Teichholz Method 53.80 mL    Left Ventricular End Diastolic Volume by Teichholz Method 131.89 mL    IVS 1.1 0.6 - 1.1 cm    LVOT diameter 2.3 cm    LVOT area 4.2 cm2    FS 30.8 28 - 44 %    Left Ventricle Relative Wall Thickness 0.42 cm    PW 1.1 0.6 - 1.1 cm    LV mass 220.8 g    LV Mass Index 102.7 g/m2    MV Peak E Colby 0.88 m/s    TDI LATERAL 0.18 m/s    TDI SEPTAL 0.13 m/s    E/E' ratio 5.68 m/s    MV Peak A Colby 0.60 m/s    TR Max Colby 1.98 m/s    E/A ratio 1.47     IVRT 53.28 msec    E wave deceleration time 178.41 msec "    LV SEPTAL E/E' RATIO 6.77 m/s    LV LATERAL E/E' RATIO 4.89 m/s    LVOT peak maricruz 0.8 m/s    Left Ventricular Outflow Tract Mean Velocity 0.67 cm/s    Left Ventricular Outflow Tract Mean Gradient 1.89 mmHg    RVOT peak VTI 18.3 cm    TAPSE 2.42 cm    LA size 3.94 cm    Left Atrium Minor Axis 5.17 cm    Left Atrium Major Axis 4.88 cm    RA Major Axis 4.28 cm    AV mean gradient 4.3 mmHg    AV peak gradient 6.8 mmHg    Ao peak maricruz 1.3 m/s    Ao VTI 25.9 cm    LVOT peak VTI 18.4 cm    AV valve area 3.0 cm²    AV Velocity Ratio 0.62     AV index (prosthetic) 0.71     MICKY by Velocity Ratio 2.6 cm²    MV stenosis pressure 1/2 time 51.74 ms    MV valve area p 1/2 method 4.25 cm2    Triscuspid Valve Regurgitation Peak Gradient 16 mmHg    PV mean gradient 2 mmHg    RVOT peak maricruz 0.80 m/s    Ao root annulus 3.65 cm    STJ 3.96 cm    Ascending aorta 3.78 cm    IVC diameter 1.72 cm    Mean e' 0.16 m/s    ZLVIDS -1.32     ZLVIDD -2.94     ELIJAH 31.3 mL/m2    LA Vol 67.26 cm3    LA WIDTH 4.0 cm    RA Width 3.5 cm    EF 65 %    TV resting pulmonary artery pressure 19 mmHg    RV TB RVSP 5 mmHg    Est. RA pres 3 mmHg    Narrative      Left Ventricle: The left ventricle is normal in size. Normal wall   thickness. Normal wall motion. There is normal systolic function with a   visually estimated ejection fraction of 60 - 65%. Ejection fraction is   approximately 65%. There is normal diastolic function.    Right Ventricle: Normal right ventricular cavity size. Wall thickness   is normal. Right ventricle wall motion  is normal. Systolic function is   normal.    Pulmonary Artery: The estimated pulmonary artery systolic pressure is   19 mmHg.    IVC/SVC: Normal venous pressure at 3 mmHg.

## 2024-10-14 ENCOUNTER — DOCUMENTATION ONLY (OUTPATIENT)
Dept: CARDIOLOGY | Facility: CLINIC | Age: 39
End: 2024-10-14
Payer: MEDICARE

## 2024-10-14 NOTE — PROGRESS NOTES
[facility] ([facility]) Team notified of pt referral via Ambulatory Referral to Heart Failure Transitional Care ([id number]).    Patient screened today by provider and HF nurse for enrollment to program.      Pt was deemed not a candidate for enrollment at this time related to patient is currently on hemo-dialysis.    Pt will require additional follow up planning per primary team.     If pt status, diagnosis, or treatment plan changes , please place AMB referral to Heart Failure Clinic ([id number]).